# Patient Record
Sex: FEMALE | Race: BLACK OR AFRICAN AMERICAN | Employment: PART TIME | URBAN - METROPOLITAN AREA
[De-identification: names, ages, dates, MRNs, and addresses within clinical notes are randomized per-mention and may not be internally consistent; named-entity substitution may affect disease eponyms.]

---

## 2020-09-13 ENCOUNTER — APPOINTMENT (OUTPATIENT)
Dept: GENERAL RADIOLOGY | Age: 30
End: 2020-09-13
Payer: COMMERCIAL

## 2020-09-13 ENCOUNTER — HOSPITAL ENCOUNTER (EMERGENCY)
Age: 30
Discharge: HOME | End: 2020-09-13
Attending: EMERGENCY MEDICINE
Payer: COMMERCIAL

## 2020-09-13 VITALS
WEIGHT: 172 LBS | HEART RATE: 100 BPM | BODY MASS INDEX: 34.68 KG/M2 | TEMPERATURE: 98.1 F | OXYGEN SATURATION: 98 % | SYSTOLIC BLOOD PRESSURE: 110 MMHG | DIASTOLIC BLOOD PRESSURE: 82 MMHG | RESPIRATION RATE: 18 BRPM | HEIGHT: 59 IN

## 2020-09-13 LAB
HCG, URINE, POC: NEGATIVE
Lab: NORMAL
NEGATIVE QC PASS/FAIL: NORMAL
POSITIVE QC PASS/FAIL: NORMAL

## 2020-09-13 PROCEDURE — 73030 X-RAY EXAM OF SHOULDER: CPT

## 2020-09-13 PROCEDURE — 99283 EMERGENCY DEPT VISIT LOW MDM: CPT

## 2020-09-13 PROCEDURE — 71045 X-RAY EXAM CHEST 1 VIEW: CPT

## 2020-09-13 PROCEDURE — 96372 THER/PROPH/DIAG INJ SC/IM: CPT

## 2020-09-13 PROCEDURE — 6360000002 HC RX W HCPCS: Performed by: EMERGENCY MEDICINE

## 2020-09-13 RX ORDER — IBUPROFEN 800 MG/1
800 TABLET ORAL EVERY 8 HOURS PRN
Qty: 15 TABLET | Refills: 0 | Status: SHIPPED | OUTPATIENT
Start: 2020-09-13 | End: 2021-04-29

## 2020-09-13 RX ORDER — CYCLOBENZAPRINE HCL 5 MG
5 TABLET ORAL 2 TIMES DAILY PRN
Qty: 10 TABLET | Refills: 0 | Status: SHIPPED | OUTPATIENT
Start: 2020-09-13 | End: 2020-09-23

## 2020-09-13 RX ORDER — KETOROLAC TROMETHAMINE 30 MG/ML
30 INJECTION, SOLUTION INTRAMUSCULAR; INTRAVENOUS ONCE
Status: COMPLETED | OUTPATIENT
Start: 2020-09-13 | End: 2020-09-13

## 2020-09-13 RX ORDER — ORPHENADRINE CITRATE 30 MG/ML
60 INJECTION INTRAMUSCULAR; INTRAVENOUS ONCE
Status: COMPLETED | OUTPATIENT
Start: 2020-09-13 | End: 2020-09-13

## 2020-09-13 RX ORDER — SODIUM CHLORIDE 9 MG/ML
INJECTION, SOLUTION INTRAVENOUS CONTINUOUS
Status: DISCONTINUED | OUTPATIENT
Start: 2020-09-13 | End: 2020-09-13

## 2020-09-13 RX ADMIN — KETOROLAC TROMETHAMINE 30 MG: 30 INJECTION, SOLUTION INTRAMUSCULAR; INTRAVENOUS at 05:40

## 2020-09-13 RX ADMIN — ORPHENADRINE CITRATE 60 MG: 60 INJECTION INTRAMUSCULAR; INTRAVENOUS at 05:40

## 2020-09-13 ASSESSMENT — PAIN DESCRIPTION - DESCRIPTORS: DESCRIPTORS: BURNING

## 2020-09-13 ASSESSMENT — PAIN SCALES - GENERAL
PAINLEVEL_OUTOF10: 8
PAINLEVEL_OUTOF10: 10

## 2020-09-13 ASSESSMENT — PAIN DESCRIPTION - PAIN TYPE: TYPE: ACUTE PAIN

## 2020-09-13 ASSESSMENT — PAIN DESCRIPTION - LOCATION: LOCATION: SHOULDER

## 2020-09-13 ASSESSMENT — PAIN DESCRIPTION - FREQUENCY: FREQUENCY: CONTINUOUS

## 2020-09-13 ASSESSMENT — PAIN DESCRIPTION - ORIENTATION: ORIENTATION: LEFT

## 2020-09-13 NOTE — ED PROVIDER NOTES
HPI:  9/13/20,   Time: 5:04 AM EDT         Willa Medina is a 26 y.o. female presenting to the ED for left posterior shoulder pain, beginning 1 week ago. The complaint has been persistent, moderate in severity, and worsened by movement of left shoulder and raising arm. Patient reports no direct trauma or injury. Patient reporting some pain with deep breathing but reports no chest pain she reports no abdominal pain she reports no cough she reports no leg pain or swelling she reports no headache. Patient reports no wrist or elbow pain. Patient reporting no fever or chills or cough. She reports no numbness or tingling in her upper arms or legs. There is no family history of aortic dissection or aneurysm. ROS:   Pertinent positives and negatives are stated within HPI, all other systems reviewed and are negative.  --------------------------------------------- PAST HISTORY ---------------------------------------------  Past Medical History:  has no past medical history on file. Past Surgical History:  has a past surgical history that includes Upper gastrointestinal endoscopy. Social History:  reports that she has been smoking cigarettes. She has a 3.75 pack-year smoking history. She has never used smokeless tobacco. She reports that she does not drink alcohol or use drugs. Family History: family history is not on file. The patients home medications have been reviewed.     Allergies: No known allergies    -------------------------------------------------- RESULTS -------------------------------------------------  All laboratory and radiology results have been personally reviewed by myself   LABS:  Results for orders placed or performed during the hospital encounter of 09/13/20   POC Pregnancy Urine Qual   Result Value Ref Range    HCG, Urine, POC Negative Negative    Lot Number [id number]     Positive QC Pass/Fail Pass     Negative QC Pass/Fail Pass        RADIOLOGY:  Interpreted by Radiologist.  XR CHEST PORTABLE    (Results Pending)   XR SHOULDER LEFT (MIN 2 VIEWS)    (Results Pending)       ------------------------- NURSING NOTES AND VITALS REVIEWED ---------------------------   The nursing notes within the ED encounter and vital signs as below have been reviewed. /82   Pulse 100   Temp 98.1 °F (36.7 °C)   Resp 18   Ht 4' 11\" (1.499 m)   Wt 172 lb (78 kg)   SpO2 98%   BMI 34.74 kg/m²   Oxygen Saturation Interpretation: Normal      ---------------------------------------------------PHYSICAL EXAM--------------------------------------      Constitutional/General: Alert and oriented x3, well appearing, non toxic in NAD  Head: NC/AT  Eyes: PERRL, EOMI  Mouth: Oropharynx clear, handling secretions, no trismus  Neck: Supple, full ROM, no meningeal signs  Pulmonary: Lungs clear to auscultation bilaterally, no wheezes, rales, or rhonchi. Not in respiratory distress  Cardiovascular:  Regular rate and rhythm, no murmurs, gallops, or rubs. 2+ distal pulses  Abdomen: Soft, non tender, non distended,   Extremities: Moves all extremities x 4.   Tender posterior medial shoulder near scapula warm and well perfused  Skin: warm and dry without rash  Neurologic: GCS 15, normal strength all extremities somewhat limited on left side secondary to pain from posterior shoulder pulses are intact distally normal radial pulse  Psych: Normal Affect  This X-Ray is independently viewed and interpreted by me:   - Study: Chest X-Ray   - Number of Views: 1  - Findings: Mediastinum is normal, No infiltrate, No effusion, No cardiomegaly and No pneumothorax    This X-Ray is independently viewed and interpreted by me:   - Study: left shoulder   - Number of Views: 4   - Findings: No fracture or dislocation         ------------------------------ ED COURSE/MEDICAL DECISION MAKING----------------------  Medications   ketorolac (TORADOL) injection 30 mg (30 mg Intramuscular Given 9/13/20 0540)   orphenadrine (NORFLEX) injection 60 mg (60 mg Intramuscular Given 9/13/20 0540)         Medical Decision Making:    Patient reevaluated and medicated. Patient made aware of findings. Patient feeling much better. Patient comfortable being discharged home she is to return if symptoms worsen or persist.    Counseling: The emergency provider has spoken with the patient and discussed todays results, in addition to providing specific details for the plan of care and counseling regarding the diagnosis and prognosis. Questions are answered at this time and they are agreeable with the plan.      --------------------------------- IMPRESSION AND DISPOSITION ---------------------------------    IMPRESSION  1.  Strain of left shoulder, initial encounter        DISPOSITION  Disposition: Discharge to home  Patient condition is stable                  Marleni Becerra MD  09/13/20 4213       Marleni Becerra MD  09/13/20 0256

## 2021-02-19 ENCOUNTER — HOSPITAL ENCOUNTER (EMERGENCY)
Age: 31
Discharge: HOME | End: 2021-02-19
Payer: COMMERCIAL

## 2021-02-19 VITALS
SYSTOLIC BLOOD PRESSURE: 130 MMHG | HEART RATE: 99 BPM | HEIGHT: 59 IN | BODY MASS INDEX: 27.21 KG/M2 | RESPIRATION RATE: 16 BRPM | WEIGHT: 135 LBS | DIASTOLIC BLOOD PRESSURE: 81 MMHG | OXYGEN SATURATION: 100 % | TEMPERATURE: 97.8 F

## 2021-02-19 DIAGNOSIS — Z3A.01 LESS THAN 8 WEEKS GESTATION OF PREGNANCY: Primary | ICD-10-CM

## 2021-02-19 LAB
HCG, URINE, POC: POSITIVE
Lab: NORMAL
NEGATIVE QC PASS/FAIL: NORMAL
POSITIVE QC PASS/FAIL: NORMAL

## 2021-02-19 PROCEDURE — 99282 EMERGENCY DEPT VISIT SF MDM: CPT

## 2021-02-19 RX ORDER — PRENATAL NO.42/FOLIC ACID 1.4 MG
1 TABLET CHEW,IMMED AND DELAY REL,BIPHASE ORAL DAILY
Qty: 30 TABLET | Refills: 0 | Status: SHIPPED | OUTPATIENT
Start: 2021-02-19 | End: 2021-03-21

## 2021-02-19 NOTE — ED PROVIDER NOTES
encounter of 02/19/21   POC Pregnancy Urine   Result Value Ref Range    HCG, Urine, POC Positive Negative    Lot Number [id number]     Positive QC Pass/Fail Pass     Negative QC Pass/Fail Pass        RADIOLOGY:  Interpreted by Radiologist.  No orders to display       ------------------------- NURSING NOTES AND VITALS REVIEWED ---------------------------   The nursing notes within the ED encounter and vital signs as below have been reviewed. /81   Pulse 99   Temp 97.8 °F (36.6 °C)   Resp 16   Ht 4' 11\" (1.499 m)   Wt 135 lb (61.2 kg)   SpO2 100%   BMI 27.27 kg/m²   Oxygen Saturation Interpretation: Normal      ---------------------------------------------------PHYSICAL EXAM--------------------------------------      Constitutional/General: Alert and oriented x3, well appearing, non toxic in NAD  Head: Normocephalic and atraumatic  Eyes: PERRL, EOMI  Mouth: Oropharynx clear, handling secretions, no trismus  Neck: Supple, full ROM,   Pulmonary: Lungs clear to auscultation bilaterally, no wheezes, rales, or rhonchi. Not in respiratory distress  Cardiovascular:  Regular rate and rhythm, no murmurs, gallops, or rubs. 2+ distal pulses  Abdomen: Soft, non tender, non distended, no noted point tenderness. Extremities: Moves all extremities x 4. Warm and well perfused  Skin: warm and dry without rash  Neurologic: GCS 15,  Psych: Normal Affect      ------------------------------ ED COURSE/MEDICAL DECISION MAKING----------------------  Medications - No data to display      ED COURSE:       Medical Decision Making:    Urine pregnancy test positive here. Patient is denying any abdominal pain as well as no noted vaginal bleeding or discharge. States that she is only here for a note to state that she is pregnant because she did not get 1 from the urgent care that she just left from. Patient was provided with prescription for prenatal vitamins.   She was also provided with referral for a new OB/GYN per her request. Patient will be discharged home and educated on the importance of follow-up as well as when to return back to the emergency department with full understanding. Patient nontoxic. Patient neurovascular intact, patient safely discharged home    Counseling: The emergency provider has spoken with the patient and discussed todays results, in addition to providing specific details for the plan of care and counseling regarding the diagnosis and prognosis. Questions are answered at this time and they are agreeable with the plan.      --------------------------------- IMPRESSION AND DISPOSITION ---------------------------------    IMPRESSION  1. Less than 8 weeks gestation of pregnancy        DISPOSITION  Disposition: Discharge to home  Patient condition is good      NOTE: This report was transcribed using voice recognition software.  Every effort was made to ensure accuracy; however, inadvertent computerized transcription errors may be present     LALIT Vora - DANY  02/20/21 0010

## 2021-02-24 ENCOUNTER — HOSPITAL ENCOUNTER (EMERGENCY)
Age: 31
Discharge: HOME | End: 2021-02-24
Payer: COMMERCIAL

## 2021-02-24 ENCOUNTER — APPOINTMENT (OUTPATIENT)
Dept: ULTRASOUND IMAGING | Age: 31
End: 2021-02-24
Payer: COMMERCIAL

## 2021-02-24 VITALS
TEMPERATURE: 97.3 F | RESPIRATION RATE: 16 BRPM | WEIGHT: 134 LBS | HEART RATE: 91 BPM | DIASTOLIC BLOOD PRESSURE: 64 MMHG | HEIGHT: 59 IN | BODY MASS INDEX: 27.01 KG/M2 | OXYGEN SATURATION: 99 % | SYSTOLIC BLOOD PRESSURE: 122 MMHG

## 2021-02-24 DIAGNOSIS — O26.891 ABDOMINAL PAIN DURING PREGNANCY IN FIRST TRIMESTER: Primary | ICD-10-CM

## 2021-02-24 DIAGNOSIS — R10.9 ABDOMINAL PAIN DURING PREGNANCY IN FIRST TRIMESTER: Primary | ICD-10-CM

## 2021-02-24 LAB
ABO/RH: NORMAL
ALBUMIN SERPL-MCNC: 4 G/DL (ref 3.5–5.2)
ALP BLD-CCNC: 47 U/L (ref 35–104)
ALT SERPL-CCNC: 24 U/L (ref 0–32)
ANION GAP SERPL CALCULATED.3IONS-SCNC: 11 MMOL/L (ref 7–16)
AST SERPL-CCNC: 17 U/L (ref 0–31)
BACTERIA: NORMAL /HPF
BASOPHILS ABSOLUTE: 0.04 E9/L (ref 0–0.2)
BASOPHILS RELATIVE PERCENT: 0.6 % (ref 0–2)
BILIRUB SERPL-MCNC: 0.4 MG/DL (ref 0–1.2)
BILIRUBIN URINE: NEGATIVE
BLOOD, URINE: NEGATIVE
BUN BLDV-MCNC: 9 MG/DL (ref 6–20)
CALCIUM SERPL-MCNC: 8.9 MG/DL (ref 8.6–10.2)
CHLORIDE BLD-SCNC: 103 MMOL/L (ref 98–107)
CLARITY: CLEAR
CO2: 23 MMOL/L (ref 22–29)
COLOR: YELLOW
CREAT SERPL-MCNC: 0.9 MG/DL (ref 0.5–1)
EOSINOPHILS ABSOLUTE: 0.08 E9/L (ref 0.05–0.5)
EOSINOPHILS RELATIVE PERCENT: 1.2 % (ref 0–6)
EPITHELIAL CELLS, UA: NORMAL /HPF
GFR AFRICAN AMERICAN: >60
GFR NON-AFRICAN AMERICAN: >60 ML/MIN/1.73
GLUCOSE BLD-MCNC: 84 MG/DL (ref 74–99)
GLUCOSE URINE: NEGATIVE MG/DL
GONADOTROPIN, CHORIONIC (HCG) QUANT: ABNORMAL MIU/ML
HCG, URINE, POC: POSITIVE
HCT VFR BLD CALC: 35.3 % (ref 34–48)
HEMOGLOBIN: 11.4 G/DL (ref 11.5–15.5)
IMMATURE GRANULOCYTES #: 0.02 E9/L
IMMATURE GRANULOCYTES %: 0.3 % (ref 0–5)
KETONES, URINE: NEGATIVE MG/DL
LEUKOCYTE ESTERASE, URINE: ABNORMAL
LYMPHOCYTES ABSOLUTE: 2 E9/L (ref 1.5–4)
LYMPHOCYTES RELATIVE PERCENT: 28.9 % (ref 20–42)
Lab: NORMAL
MCH RBC QN AUTO: 27.7 PG (ref 26–35)
MCHC RBC AUTO-ENTMCNC: 32.3 % (ref 32–34.5)
MCV RBC AUTO: 85.7 FL (ref 80–99.9)
MONOCYTES ABSOLUTE: 0.51 E9/L (ref 0.1–0.95)
MONOCYTES RELATIVE PERCENT: 7.4 % (ref 2–12)
NEGATIVE QC PASS/FAIL: NORMAL
NEUTROPHILS ABSOLUTE: 4.28 E9/L (ref 1.8–7.3)
NEUTROPHILS RELATIVE PERCENT: 61.6 % (ref 43–80)
NITRITE, URINE: NEGATIVE
PDW BLD-RTO: 14.7 FL (ref 11.5–15)
PH UA: 6.5 (ref 5–9)
PLATELET # BLD: 297 E9/L (ref 130–450)
PMV BLD AUTO: 10.6 FL (ref 7–12)
POSITIVE QC PASS/FAIL: NORMAL
POTASSIUM SERPL-SCNC: 3.2 MMOL/L (ref 3.5–5)
PROTEIN UA: NEGATIVE MG/DL
RBC # BLD: 4.12 E12/L (ref 3.5–5.5)
RBC UA: NORMAL /HPF (ref 0–2)
SODIUM BLD-SCNC: 137 MMOL/L (ref 132–146)
SPECIFIC GRAVITY UA: 1.02 (ref 1–1.03)
TOTAL PROTEIN: 6.5 G/DL (ref 6.4–8.3)
UROBILINOGEN, URINE: 0.2 E.U./DL
WBC # BLD: 6.9 E9/L (ref 4.5–11.5)
WBC UA: NORMAL /HPF (ref 0–5)

## 2021-02-24 PROCEDURE — 86900 BLOOD TYPING SEROLOGIC ABO: CPT

## 2021-02-24 PROCEDURE — 99283 EMERGENCY DEPT VISIT LOW MDM: CPT

## 2021-02-24 PROCEDURE — 84702 CHORIONIC GONADOTROPIN TEST: CPT

## 2021-02-24 PROCEDURE — 81001 URINALYSIS AUTO W/SCOPE: CPT

## 2021-02-24 PROCEDURE — 6370000000 HC RX 637 (ALT 250 FOR IP): Performed by: NURSE PRACTITIONER

## 2021-02-24 PROCEDURE — 76817 TRANSVAGINAL US OBSTETRIC: CPT

## 2021-02-24 PROCEDURE — 80053 COMPREHEN METABOLIC PANEL: CPT

## 2021-02-24 PROCEDURE — 85025 COMPLETE CBC W/AUTO DIFF WBC: CPT

## 2021-02-24 PROCEDURE — 86901 BLOOD TYPING SEROLOGIC RH(D): CPT

## 2021-02-24 PROCEDURE — 2580000003 HC RX 258: Performed by: NURSE PRACTITIONER

## 2021-02-24 RX ORDER — 0.9 % SODIUM CHLORIDE 0.9 %
1000 INTRAVENOUS SOLUTION INTRAVENOUS ONCE
Status: COMPLETED | OUTPATIENT
Start: 2021-02-24 | End: 2021-02-24

## 2021-02-24 RX ORDER — ACETAMINOPHEN 500 MG
1000 TABLET ORAL ONCE
Status: COMPLETED | OUTPATIENT
Start: 2021-02-24 | End: 2021-02-24

## 2021-02-24 RX ADMIN — ACETAMINOPHEN 1000 MG: 500 TABLET ORAL at 01:19

## 2021-02-24 RX ADMIN — SODIUM CHLORIDE 1000 ML: 9 INJECTION, SOLUTION INTRAVENOUS at 01:18

## 2021-02-24 ASSESSMENT — PAIN DESCRIPTION - FREQUENCY: FREQUENCY: CONTINUOUS

## 2021-02-24 ASSESSMENT — PAIN SCALES - GENERAL: PAINLEVEL_OUTOF10: 7

## 2021-02-24 ASSESSMENT — PAIN DESCRIPTION - PAIN TYPE: TYPE: ACUTE PAIN

## 2021-02-24 NOTE — ED NOTES
Was contacted by ER and informed that I needed to call out the [facility] for a stat exam to r/o ectopic. Michelle Mendosa was called at [place] [phone].

## 2021-02-24 NOTE — ED PROVIDER NOTES
Independent   HPI:  21, Time: 12:44 AM MICHAEL Bishop is a 26 y.o. female presenting to the ED for right lower abdominal groin pain. Patient presents to the emergency department with ongoing abdominal pain. Patient reports that she did follow-up with the pregnancy center and was seen and evaluated by them she did not have any ultrasound yet to confirm intrauterine pregnancy. Patient reports that the pain is and constant. She does express some mild nausea with this. Patient reports that she does have an appointment next month at that time they will complete an ultrasound. Patient is a  5 para 4. States she is approximately 4 weeks OB. Patient denies any unusual vaginal bleeding or discharge she also denies any odor. Patient states that she does not have a specific OB/GYN and is currently following up with the pregnancy [facility]. Patient reports that she has not yet had an ultrasound to confirm intrauterine pregnancy. States her abdominal pain has been worsening since Friday. Review of Systems:   A complete review of systems was performed and pertinent positives and negatives are stated within HPI, all other systems reviewed and are negative.          --------------------------------------------- PAST HISTORY ---------------------------------------------  Past Medical History:  has no past medical history on file. Past Surgical History:  has a past surgical history that includes Upper gastrointestinal endoscopy. Social History:  reports that she has been smoking cigarettes. She has a 3.75 pack-year smoking history. She has never used smokeless tobacco. She reports that she does not drink alcohol or use drugs. Family History: family history is not on file. The patients home medications have been reviewed.     Allergies: No known allergies    -------------------------------------------------- RESULTS -------------------------------------------------  All laboratory and radiology results have been personally reviewed by myself   LABS:  Results for orders placed or performed during the hospital encounter of 02/24/21   CBC Auto Differential   Result Value Ref Range    WBC 6.9 4.5 - 11.5 E9/L    RBC 4.12 3.50 - 5.50 E12/L    Hemoglobin 11.4 (L) 11.5 - 15.5 g/dL    Hematocrit 35.3 34.0 - 48.0 %    MCV 85.7 80.0 - 99.9 fL    MCH 27.7 26.0 - 35.0 pg    MCHC 32.3 32.0 - 34.5 %    RDW 14.7 11.5 - 15.0 fL    Platelets 120 907 - 418 E9/L    MPV 10.6 7.0 - 12.0 fL    Neutrophils % 61.6 43.0 - 80.0 %    Immature Granulocytes % 0.3 0.0 - 5.0 %    Lymphocytes % 28.9 20.0 - 42.0 %    Monocytes % 7.4 2.0 - 12.0 %    Eosinophils % 1.2 0.0 - 6.0 %    Basophils % 0.6 0.0 - 2.0 %    Neutrophils Absolute 4.28 1.80 - 7.30 E9/L    Immature Granulocytes # 0.02 E9/L    Lymphocytes Absolute 2.00 1.50 - 4.00 E9/L    Monocytes Absolute 0.51 0.10 - 0.95 E9/L    Eosinophils Absolute 0.08 0.05 - 0.50 E9/L    Basophils Absolute 0.04 0.00 - 0.20 E9/L   Comprehensive Metabolic Panel   Result Value Ref Range    Sodium 137 132 - 146 mmol/L    Potassium 3.2 (L) 3.5 - 5.0 mmol/L    Chloride 103 98 - 107 mmol/L    CO2 23 22 - 29 mmol/L    Anion Gap 11 7 - 16 mmol/L    Glucose 84 74 - 99 mg/dL    BUN 9 6 - 20 mg/dL    CREATININE 0.9 0.5 - 1.0 mg/dL    GFR Non-African American >60 >=60 mL/min/1.73    GFR African American >60     Calcium 8.9 8.6 - 10.2 mg/dL    Total Protein 6.5 6.4 - 8.3 g/dL    Albumin 4.0 3.5 - 5.2 g/dL    Total Bilirubin 0.4 0.0 - 1.2 mg/dL    Alkaline Phosphatase 47 35 - 104 U/L    ALT 24 0 - 32 U/L    AST 17 0 - 31 U/L   Urinalysis   Result Value Ref Range    Color, UA Yellow Straw/Yellow    Clarity, UA Clear Clear    Glucose, Ur Negative Negative mg/dL    Bilirubin Urine Negative Negative    Ketones, Urine Negative Negative mg/dL    Specific Gravity, UA 1.025 1.005 - 1.030    Blood, Urine Negative Negative    pH, UA 6.5 5.0 - 9.0    Protein, UA Negative Negative mg/dL    Urobilinogen, Urine 0.2 <2.0 E.U./dL    Nitrite, Urine Negative Negative    Leukocyte Esterase, Urine TRACE (A) Negative   hCG, quantitative, pregnancy   Result Value Ref Range    hCG Quant 38148.0 (H) <10 mIU/mL   Microscopic Urinalysis   Result Value Ref Range    WBC, UA SEE BELOW 0 - 5 /HPF    RBC, UA SEE BELOW 0 - 2 /HPF    Epithelial Cells, UA SEE BELOW /HPF    Bacteria, UA SEE BELOW None Seen /HPF   POC Pregnancy Urine   Result Value Ref Range    HCG, Urine, POC Positive Negative    Lot Number [id number]     Positive QC Pass/Fail Pass     Negative QC Pass/Fail Pass    ABO/RH   Result Value Ref Range    ABO/Rh B POS        RADIOLOGY:  Interpreted by Radiologist.  US OB TRANSVAGINAL   Final Result   Single viable intrauterine pregnancy.          ------------------------- NURSING NOTES AND VITALS REVIEWED ---------------------------   The nursing notes within the ED encounter and vital signs as below have been reviewed. /64   Pulse 91   Temp 97.3 °F (36.3 °C)   Resp 16   Ht 4' 11\" (1.499 m)   Wt 134 lb (60.8 kg)   LMP 01/19/2021   SpO2 99%   BMI 27.06 kg/m²   Oxygen Saturation Interpretation: Normal      ---------------------------------------------------PHYSICAL EXAM--------------------------------------      Constitutional/General: Alert and oriented x3, well appearing, non toxic in NAD  Head: Normocephalic and atraumatic  Eyes: PERRL, EOMI  Mouth: Oropharynx clear, handling secretions, no trismus  Neck: Supple, full ROM,   Pulmonary: Lungs clear to auscultation bilaterally, no wheezes, rales, or rhonchi. Not in respiratory distress  Cardiovascular:  Regular rate and rhythm, no murmurs, gallops, or rubs. 2+ distal pulses  Abdomen: Soft, tender, non distended,   Extremities: Moves all extremities x 4.  Warm and well perfused  Skin: warm and dry without rash  Neurologic: GCS 15,  Psych: Normal Affect      ------------------------------ ED COURSE/MEDICAL DECISION MAKING----------------------  Medications   0.9 % sodium chloride bolus (0 mLs Intravenous Stopped 2/24/21 0227)   acetaminophen (TYLENOL) tablet 1,000 mg (1,000 mg Oral Given 2/24/21 0119)         ED COURSE:       Medical Decision Making: Plan be for labs will also obtain an ultrasound rule out ectopic pregnancy. Patient presenting with abdominal pain primarily to the right lower groin abdomen that started on Friday. Patient denies any unusual vaginal bleeding or discharge or odor. Patient has not yet had an ultrasound to confirm intrauterine pregnancy. Labs resulted CBC unremarkable, chemistry panel with potassium level slightly low at 3.2 will replete. Urinalysis negative, patient is B+. Beta quant 47,054 awaiting ultrasound results. Ultrasound resulted it is fully unremarkable, showing a single intrauterine gestation at 6 weeks and 5 days, fetal heart rate 133. Patient was made aware of results she was educated on pelvic rest, patient likely with muscle skeletal pain. She does have strong right dorsal pedal pulse. There is no unusual hernia palpated to the right inguinal region. And on exam no gross tenderness. Patient otherwise nontoxic, neurovascular intact. She was made aware to return back if she develops any unusual bleeding, return of abdominal pain she was made aware take Tylenol. Patient also educated to follow-up with her OB GYN within the next 3 days for continued close follow-up care. Patient expressed understanding and safely discharged home       Counseling: The emergency provider has spoken with the patient and discussed todays results, in addition to providing specific details for the plan of care and counseling regarding the diagnosis and prognosis. Questions are answered at this time and they are agreeable with the plan.      --------------------------------- IMPRESSION AND DISPOSITION ---------------------------------    IMPRESSION  1.  Abdominal pain during pregnancy in first trimester        DISPOSITION  Disposition: Discharge to home  Patient condition is good      NOTE: This report was transcribed using voice recognition software.  Every effort was made to ensure accuracy; however, inadvertent computerized transcription errors may be present     LALIT Muhammad - DANY  02/24/21 0314

## 2021-06-23 ENCOUNTER — HOSPITAL ENCOUNTER (EMERGENCY)
Age: 31
Discharge: LEFT BEFORE DISCHARGE | End: 2021-06-23
Attending: EMERGENCY MEDICINE
Payer: COMMERCIAL

## 2021-06-23 VITALS
OXYGEN SATURATION: 97 % | TEMPERATURE: 98.7 F | RESPIRATION RATE: 16 BRPM | DIASTOLIC BLOOD PRESSURE: 67 MMHG | SYSTOLIC BLOOD PRESSURE: 106 MMHG | HEART RATE: 115 BPM
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
## 2021-06-23 PROCEDURE — 99285 EMERGENCY DEPT VISIT HI MDM: CPT
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
## 2021-06-23 NOTE — ED NOTES
Bed:  HALL-02  Expected date:   Expected time:   Means of arrival:   Comments:  EMS     Emily Lizama RN  06/23/21 2282

## 2021-07-22 ENCOUNTER — TELEPHONE (OUTPATIENT)
Dept: ADMINISTRATIVE | Age: 31
End: 2021-07-22

## 2021-08-24 ENCOUNTER — INITIAL PRENATAL (OUTPATIENT)
Dept: OBGYN | Age: 31
End: 2021-08-24
Payer: COMMERCIAL

## 2021-08-24 VITALS
SYSTOLIC BLOOD PRESSURE: 123 MMHG | DIASTOLIC BLOOD PRESSURE: 59 MMHG | BODY MASS INDEX: 31.91 KG/M2 | WEIGHT: 158 LBS | HEART RATE: 99 BPM

## 2021-08-24 DIAGNOSIS — Z3A.32 32 WEEKS GESTATION OF PREGNANCY: Primary | ICD-10-CM

## 2021-08-24 LAB
GLUCOSE URINE, POC: NEGATIVE
PROTEIN UA: NEGATIVE

## 2021-08-24 PROCEDURE — 99203 OFFICE O/P NEW LOW 30 MIN: CPT | Performed by: STUDENT IN AN ORGANIZED HEALTH CARE EDUCATION/TRAINING PROGRAM

## 2021-08-24 PROCEDURE — G8427 DOCREV CUR MEDS BY ELIG CLIN: HCPCS | Performed by: STUDENT IN AN ORGANIZED HEALTH CARE EDUCATION/TRAINING PROGRAM

## 2021-08-24 PROCEDURE — 1036F TOBACCO NON-USER: CPT | Performed by: STUDENT IN AN ORGANIZED HEALTH CARE EDUCATION/TRAINING PROGRAM

## 2021-08-24 PROCEDURE — 81002 URINALYSIS NONAUTO W/O SCOPE: CPT | Performed by: STUDENT IN AN ORGANIZED HEALTH CARE EDUCATION/TRAINING PROGRAM

## 2021-08-24 PROCEDURE — G8419 CALC BMI OUT NRM PARAM NOF/U: HCPCS | Performed by: STUDENT IN AN ORGANIZED HEALTH CARE EDUCATION/TRAINING PROGRAM

## 2021-08-24 RX ORDER — FOLIC ACID, .BETA.-CAROTENE, ASCORBIC ACID, CHOLECALCIFEROL, .ALPHA.-TOCOPHEROL ACETATE, DL-, THIAMINE MONONITRATE, RIBOFLAVIN, NIACINAMIDE, PYRIDOXINE HYDROCHLORIDE, CYANOCOBALAMIN, CALCIUM PANTOTHENATE, CALCIUM CARBONATE, FERROUS FUMARATE, AND ZINC OXIDE 1; 1000; 100; 400; 30; 3; 3; 15; 20; 12; 7; 200; 29; 20 MG/1; [IU]/1; MG/1; [IU]/1; [IU]/1; MG/1; MG/1; MG/1; MG/1; UG/1; MG/1; MG/1; MG/1; MG/1
1 TABLET, CHEWABLE ORAL DAILY
Qty: 90 TABLET | Refills: 1 | Status: SHIPPED | OUTPATIENT
Start: 2021-08-24

## 2021-08-24 NOTE — PROGRESS NOTES
Pt new to office and is transferring care from Dr. Jovana Weiner. Pt is a  at 32w4d gestation. She denies feeling any ctxs, lof or vb. Perceives good fetal movement. Discharge instructions have been discussed with the patient by Dr. Joaquin Kwan and she voiced understanding of plan of care. Patient advised to call our office with any questions or concerns.

## 2021-08-24 NOTE — PROGRESS NOTES
ADARSH at 32.4 weeks  Last time seen was at 27 weeks   Per patient, did not do GTT or get anatomy scan  No medical or surgical history   x 4, largest baby < 7 pounds  Baby is moving \"too much. \"  Denies LOF, vaginal bleeding, HA, blurry vision, SOB, tinnitis  Discussed importance of covid vaccine -interested in getting it  [facility] referral for US  Will do GTT this week  Refill of prenatal vitamins  F/u in 2 weeks

## 2021-08-28 ENCOUNTER — HOSPITAL ENCOUNTER (OUTPATIENT)
Age: 31
Discharge: HOME | End: 2021-08-28
Attending: OBSTETRICS & GYNECOLOGY | Admitting: OBSTETRICS & GYNECOLOGY
Payer: COMMERCIAL

## 2021-08-28 PROBLEM — Z34.90 NORMAL PREGNANCY, UNSPECIFIED TRIMESTER: Status: ACTIVE | Noted: 2021-08-28

## 2021-08-28 LAB
BACTERIA: ABNORMAL /HPF
BILIRUBIN URINE: NEGATIVE
BLOOD, URINE: NEGATIVE
CLARITY: CLEAR
COLOR: YELLOW
GLUCOSE URINE: NEGATIVE MG/DL
HCT VFR BLD CALC: 27.5 % (ref 34–48)
HEMOGLOBIN: 9.1 G/DL (ref 11.5–15.5)
KETONES, URINE: NEGATIVE MG/DL
LEUKOCYTE ESTERASE, URINE: ABNORMAL
MCH RBC QN AUTO: 26.9 PG (ref 26–35)
MCHC RBC AUTO-ENTMCNC: 33.1 % (ref 32–34.5)
MCV RBC AUTO: 81.4 FL (ref 80–99.9)
NITRITE, URINE: NEGATIVE
PDW BLD-RTO: 14.9 FL (ref 11.5–15)
PH UA: 7 (ref 5–9)
PLATELET # BLD: 233 E9/L (ref 130–450)
PMV BLD AUTO: 10.6 FL (ref 7–12)
PROTEIN UA: NEGATIVE MG/DL
RBC # BLD: 3.38 E12/L (ref 3.5–5.5)
RBC UA: ABNORMAL /HPF (ref 0–2)
SPECIFIC GRAVITY UA: 1.02 (ref 1–1.03)
UROBILINOGEN, URINE: 0.2 E.U./DL
WBC # BLD: 6.9 E9/L (ref 4.5–11.5)
WBC UA: ABNORMAL /HPF (ref 0–5)

## 2021-08-28 PROCEDURE — 36415 COLL VENOUS BLD VENIPUNCTURE: CPT

## 2021-08-28 PROCEDURE — 81001 URINALYSIS AUTO W/SCOPE: CPT

## 2021-08-28 PROCEDURE — 99235 HOSP IP/OBS SAME DATE MOD 70: CPT | Performed by: OBSTETRICS & GYNECOLOGY

## 2021-08-28 PROCEDURE — 85027 COMPLETE CBC AUTOMATED: CPT

## 2021-08-28 RX ORDER — SODIUM CHLORIDE, SODIUM LACTATE, POTASSIUM CHLORIDE, CALCIUM CHLORIDE 600; 310; 30; 20 MG/100ML; MG/100ML; MG/100ML; MG/100ML
INJECTION, SOLUTION INTRAVENOUS CONTINUOUS
Status: DISCONTINUED | OUTPATIENT
Start: 2021-08-28 | End: 2021-08-28 | Stop reason: HOSPADM

## 2021-08-28 NOTE — PROGRESS NOTES
Pt presents to unit with complaints of abd pain since 1015 this am.  Pt denies any LOF or VB. Good FM perceived. TOCO and EFM applied. Will notify house officer of pts arrival to the unit.

## 2021-08-28 NOTE — H&P
Subjective:      Chely Juarez is an 33 y.o. female at 35 and 1/7 weeks gestation presenting with   Chief Complaint   Patient presents with    Abdominal Pain   . She is also complaining of contractions every a few minutes lasting few seconds. Other associated symptoms include low back pain. Fetal Movement: normal.  No past medical history on file. Review of Systems  Pertinent items are noted in HPI. Objective:      LMP 12/20/2020   Last menstrual period 12/20/2020, unknown if currently breastfeeding. General:   alert, appears stated age and cooperative   Cervix:   closed.    FHT:   140 BPM     Lab Review    CBC:   Lab Results   Component Value Date    WBC 6.9 08/28/2021    RBC 3.38 08/28/2021    HGB 9.1 08/28/2021    HCT 27.5 08/28/2021    MCV 81.4 08/28/2021    MCH 26.9 08/28/2021    MCHC 33.1 08/28/2021    RDW 14.9 08/28/2021     08/28/2021    MPV 10.6 08/28/2021     CMP:    Lab Results   Component Value Date     02/24/2021    K 3.2 02/24/2021     02/24/2021    CO2 23 02/24/2021    BUN 9 02/24/2021    CREATININE 0.9 02/24/2021    GFRAA >60 02/24/2021    LABGLOM >60 02/24/2021    GLUCOSE 84 02/24/2021    GLUCOSE 86 04/02/2012    PROT 6.5 02/24/2021    LABALBU 4.0 02/24/2021    CALCIUM 8.9 02/24/2021    BILITOT 0.4 02/24/2021    ALKPHOS 47 02/24/2021    AST 17 02/24/2021    ALT 24 02/24/2021     U/A:    Lab Results   Component Value Date    NITRITE NEG 04/29/2021    COLORU Yellow 08/28/2021    PHUR 7.0 08/28/2021    WBCUA 0-1 08/28/2021    WBCUA 2-5 04/02/2012    RBCUA NONE 08/28/2021    RBCUA NONE 04/02/2012    BACTERIA FEW 08/28/2021    CLARITYU Clear 08/28/2021    SPECGRAV 1.020 08/28/2021    LEUKOCYTESUR TRACE 08/28/2021    UROBILINOGEN 0.2 08/28/2021    BILIRUBINUR Negative 08/28/2021    BILIRUBINUR POSITIVE 04/29/2021    BILIRUBINUR NEGATIVE 04/02/2012    BLOODU Negative 08/28/2021    GLUCOSEU Negative 08/28/2021    GLUCOSEU NEGATIVE 04/02/2012    AMORPHOUS FEW 08/10/2015 Assessment:     Round ligament pain    Plan:     CBC and urinalysis were obtained and were within normal limits  IV fluids were given and along with bedrest, patient symptoms resolved spontaneously  Discharge home  Follow-up as scheduled    Mis Ventura MD,MD,8/28/2021 5:13 PM

## 2021-08-30 ENCOUNTER — INITIAL PRENATAL (OUTPATIENT)
Dept: OBGYN CLINIC | Age: 31
End: 2021-08-30
Payer: COMMERCIAL

## 2021-08-30 ENCOUNTER — ANCILLARY PROCEDURE (OUTPATIENT)
Dept: OBGYN CLINIC | Age: 31
End: 2021-08-30
Payer: COMMERCIAL

## 2021-08-30 VITALS
SYSTOLIC BLOOD PRESSURE: 108 MMHG | HEART RATE: 103 BPM | WEIGHT: 162.4 LBS | DIASTOLIC BLOOD PRESSURE: 69 MMHG | HEIGHT: 59 IN | BODY MASS INDEX: 32.74 KG/M2

## 2021-08-30 DIAGNOSIS — Z36.89 ENCOUNTER FOR FETAL ANATOMIC SURVEY: ICD-10-CM

## 2021-08-30 DIAGNOSIS — Z3A.33 33 WEEKS GESTATION OF PREGNANCY: ICD-10-CM

## 2021-08-30 DIAGNOSIS — O99.213 OBESITY AFFECTING PREGNANCY IN THIRD TRIMESTER: Primary | ICD-10-CM

## 2021-08-30 LAB
GLUCOSE URINE, POC: NEGATIVE
PROTEIN UA: NEGATIVE

## 2021-08-30 PROCEDURE — 76811 OB US DETAILED SNGL FETUS: CPT | Performed by: OBSTETRICS & GYNECOLOGY

## 2021-08-30 PROCEDURE — G8419 CALC BMI OUT NRM PARAM NOF/U: HCPCS | Performed by: OBSTETRICS & GYNECOLOGY

## 2021-08-30 PROCEDURE — 99242 OFF/OP CONSLTJ NEW/EST SF 20: CPT | Performed by: OBSTETRICS & GYNECOLOGY

## 2021-08-30 PROCEDURE — 81002 URINALYSIS NONAUTO W/O SCOPE: CPT | Performed by: OBSTETRICS & GYNECOLOGY

## 2021-08-30 PROCEDURE — 99203 OFFICE O/P NEW LOW 30 MIN: CPT | Performed by: OBSTETRICS & GYNECOLOGY

## 2021-08-30 PROCEDURE — G8427 DOCREV CUR MEDS BY ELIG CLIN: HCPCS | Performed by: OBSTETRICS & GYNECOLOGY

## 2021-08-30 PROCEDURE — 76819 FETAL BIOPHYS PROFIL W/O NST: CPT | Performed by: OBSTETRICS & GYNECOLOGY
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
## 2021-08-30 NOTE — PATIENT INSTRUCTIONS
if you are sick, not feeling well or have an infectious process going on please reschedule your appointment by calling [phone]. Also if any family members are not feeling well, please do not bring them to your appointment. We appreciate your cooperation. We are doing this in order to protect our pregnant mothers+ their babies. Call your primary obstetrician with bleeding, leaking of fluid, abdominal tenderness, headache, blurry vision, epigastric pain and increased urinary frequency. Any questions contact Shira at [phone]. If you are experiencing an emergency and need immediate help, call 911 or go to go emergency room or labor and delivery. Please arrive for your scheduled appointment at least 15 minutes early with your actual insurance card+ a photo ID. Also if you need any refills ordered or have questions, it may take up 48 hours to reply. Please allow ample time for your refills. Call me when you use last refill. Thank you for your cooperation. You might be having an NST at your next appt. Please eat a large snack or breakfast before coming to office. Thank youDo kick counts after dinner. Call your primary obstetrician if less than 10 kicks in 2 hours after dinner. Call your primary obstetrician with bleeding, leaking of fluid, abdominal tenderness, headache, blurry vision, epigastric pain and increased urinary frequency. Patient Education        Weeks 32 to 29 of Your Pregnancy: Care Instructions  Overview     During the last few weeks of your pregnancy, you may have more aches and pains. It's important to rest when you can. Your growing baby is putting more pressure on your bladder. So you may need to urinate more often. Hemorrhoids are also common. These are painful, itchy veins in the rectal area. You may want to talk with your doctor about banking your baby's umbilical cord blood. This is the blood left in the cord after birth.  If you want to save this blood, you must arrange it ahead of time. You can't decide at the last minute. If you haven't already had the Tdap shot during this pregnancy, talk to your doctor about getting it. It will help protect your  against pertussis infection. Follow-up care is a key part of your treatment and safety. Be sure to make and go to all appointments, and call your doctor if you are having problems. It's also a good idea to know your test results and keep a list of the medicines you take. How can you care for yourself at home? Ease hemorrhoids  · Get more liquids, fruits, vegetables, and fiber in your diet. This will help keep your stools soft. · Avoid sitting for too long. Lie on your left side several times a day. · Clean yourself with soft, moist toilet paper. Or you can use witch hazel pads or personal hygiene pads. · If you are uncomfortable, try ice packs. Or you can sit in a warm sitz bath. Do these for 20 minutes at a time, as needed. · Use hydrocortisone cream for pain and itching. Two examples are Anusol and Preparation H Hydrocortisone. · Ask your doctor about taking an over-the-counter stool softener. Consider breastfeeding  · Experts recommend that women breastfeed for 1 year or longer. · Breast milk may help protect your child from some health problems.  babies are less likely than formula-fed babies to:  ? Get ear infections, colds, diarrhea, and pneumonia. ? Be obese or get diabetes later in life. · Women who breastfeed have less bleeding after the birth. Their uteruses also shrink back faster. · Some women who breastfeed lose weight faster. Making milk burns calories. · Breastfeeding can lower your risk of breast cancer, ovarian cancer, and osteoporosis. Decide about circumcision for boys  · As you make this decision, it may help to think about your personal, Zoroastrianism, and family traditions. You get to decide if you will keep your son's penis natural or if he will be circumcised.   · If you decide that you would like to have your baby circumcised, talk with your doctor. You can share your concerns about pain. And you can discuss your preferences for anesthesia. Where can you learn more? Go to https://chperigo.healthTunePatrol. org and sign in to your CardioDx account. Enter Q121 in the [facility] box to learn more about \"Weeks 32 to 34 of Your Pregnancy: Care Instructions. \"     If you do not have an account, please click on the \"Sign Up Now\" link. Current as of: October 8, 2020               Content Version: 12.9  © 9887-8132 [employer]. Care instructions adapted under license by [facility] ([facility]). If you have questions about a medical condition or this instruction, always ask your healthcare professional. [place] [address] any warranty or liability for your use of this information. Patient Education        Learning About When to Call Your Doctor During Pregnancy (After 20 Weeks)  Your Care Instructions  It's common to have concerns about what might be a problem during pregnancy. Although most pregnant women don't have any serious problems, it's important to know when to call your doctor if you have certain symptoms or signs of labor. These are general suggestions. Your doctor may give you some more information about when to call. When to call your doctor (after 20 weeks)  Call 911  anytime you think you may need emergency care. For example, call if:  · You have severe vaginal bleeding. · You have sudden, severe pain in your belly. · You passed out (lost consciousness). · You have a seizure. · You see or feel the umbilical cord. · You think you are about to deliver your baby and can't make it safely to the hospital.  Call your doctor now or seek immediate medical care if:  · You have vaginal bleeding. · You have belly pain. · You have a fever. · You have symptoms of preeclampsia, such as:  ? Sudden swelling of your face, hands, or feet.   ? New vision problems (such as dimness, blurring, or seeing spots). ? A severe headache. · You have a sudden release of fluid from your vagina. (You think your water broke.)  · You think that you may be in labor. This means that you've had at least 6 contractions in an hour. · You notice that your baby has stopped moving or is moving much less than normal.  · You have symptoms of a urinary tract infection. These may include:  ? Pain or burning when you urinate. ? A frequent need to urinate without being able to pass much urine. ? Pain in the flank, which is just below the rib cage and above the waist on either side of the back. ? Blood in your urine. Watch closely for changes in your health, and be sure to contact your doctor if:  · You have vaginal discharge that smells bad. · You have skin changes, such as:  ? A rash. ? Itching. ? Yellow color to your skin. · You have other concerns about your pregnancy. If you have labor signs at 37 weeks or more  If you have signs of labor at 37 weeks or more, your doctor may tell you to call when your labor becomes more active. Symptoms of active labor include:  · Contractions that are regular. · Contractions that are less than 5 minutes apart. · Contractions that are hard to talk through. Follow-up care is a key part of your treatment and safety. Be sure to make and go to all appointments, and call your doctor if you are having problems. It's also a good idea to know your test results and keep a list of the medicines you take. Where can you learn more? Go to https://Oculus360chantelle.Bundle. org and sign in to your Trampoline account. Enter  in the [facility] box to learn more about \"Learning About When to Call Your Doctor During Pregnancy (After 20 Weeks). \"     If you do not have an account, please click on the \"Sign Up Now\" link. Current as of: October 8, 2020               Content Version: 12.9  © 9821-6107 [employer].    Care instructions adapted under license by [address] If you have questions about a medical condition or this instruction, always ask your healthcare professional. [place] [address] any warranty or liability for your use of this information. Patient Education        Counting Your Baby's Kicks: Care Instructions  Your Care Instructions     Counting your baby's kicks is one way your doctor can tell that your baby is healthy. Most women--especially in a first pregnancy--feel their baby move for the first time between 16 and 22 weeks. The movement may feel like flutters rather than kicks. Your baby may move more at certain times of the day. When you are active, you may notice less kicking than when you are resting. At your prenatal visits, your doctor will ask whether the baby is active. In your last trimester, your doctor may ask you to count the number of times you feel your baby move. Follow-up care is a key part of your treatment and safety. Be sure to make and go to all appointments, and call your doctor if you are having problems. It's also a good idea to know your test results and keep a list of the medicines you take. How do you count fetal kicks? · A common method of checking your baby's movement is to count the number of kicks or moves you feel in 1 hour. Ten movements (such as kicks, flutters, or rolls) in 1 hour are normal. Some doctors suggest that you count in the morning until you get to 10 movements. Then you can quit for that day and start again the next day. · Pick your baby's most active time of day to count. This may be any time from morning to evening. · If you do not feel 10 movements in an hour, your baby may be sleeping. Wait for the next hour and count again. When should you call for help?    Call your doctor now or seek immediate medical care if:    · You noticed that your baby has stopped moving or is moving much less than normal.   Watch closely for changes in your health, and be sure to

## 2021-09-15 ENCOUNTER — ROUTINE PRENATAL (OUTPATIENT)
Dept: OBGYN | Age: 31
End: 2021-09-15
Payer: COMMERCIAL

## 2021-09-15 VITALS
WEIGHT: 160 LBS | DIASTOLIC BLOOD PRESSURE: 70 MMHG | BODY MASS INDEX: 32.32 KG/M2 | HEART RATE: 75 BPM | SYSTOLIC BLOOD PRESSURE: 106 MMHG

## 2021-09-15 DIAGNOSIS — E66.09 CLASS 1 OBESITY DUE TO EXCESS CALORIES WITHOUT SERIOUS COMORBIDITY WITH BODY MASS INDEX (BMI) OF 30.0 TO 30.9 IN ADULT: ICD-10-CM

## 2021-09-15 DIAGNOSIS — Z34.93 PRENATAL CARE IN THIRD TRIMESTER: Primary | ICD-10-CM

## 2021-09-15 DIAGNOSIS — B37.9 MONILIA INFECTION: ICD-10-CM

## 2021-09-15 PROCEDURE — G8427 DOCREV CUR MEDS BY ELIG CLIN: HCPCS | Performed by: OBSTETRICS & GYNECOLOGY

## 2021-09-15 PROCEDURE — 99213 OFFICE O/P EST LOW 20 MIN: CPT | Performed by: OBSTETRICS & GYNECOLOGY

## 2021-09-15 PROCEDURE — 99212 OFFICE O/P EST SF 10 MIN: CPT | Performed by: OBSTETRICS & GYNECOLOGY

## 2021-09-15 PROCEDURE — G8419 CALC BMI OUT NRM PARAM NOF/U: HCPCS | Performed by: OBSTETRICS & GYNECOLOGY

## 2021-09-15 PROCEDURE — 4004F PT TOBACCO SCREEN RCVD TLK: CPT | Performed by: OBSTETRICS & GYNECOLOGY

## 2021-09-15 NOTE — PROGRESS NOTES
This is a new patient to me. Transferred a bit ago from Dr. Anisha Ramirez. Has been seeing MFM. Dr. Lito Gao wanted her to get a 2 hour GTT but that has not been done, discussed with the patient. Cultures done today and sent to lab. Patient has an order she says from Dr. Lito Gao for the 2 hour GTT. I told her we need that done ASAP.

## 2021-09-15 NOTE — PROGRESS NOTES
Pt is here today for prenatal visit at 35w5d. Pt having Morning Sun Silva contractions. Pt unable to void at this time. Baby is active per pt. Denies bleeding, leaking of fluids. Specimen obtained, labeled and sent to lab. Plan of care discussed with pt by provider.

## 2021-10-05 ENCOUNTER — ROUTINE PRENATAL (OUTPATIENT)
Dept: OBGYN | Age: 31
End: 2021-10-05
Payer: COMMERCIAL

## 2021-10-05 VITALS
BODY MASS INDEX: 34.74 KG/M2 | WEIGHT: 172 LBS | SYSTOLIC BLOOD PRESSURE: 135 MMHG | HEART RATE: 113 BPM | DIASTOLIC BLOOD PRESSURE: 69 MMHG

## 2021-10-05 DIAGNOSIS — Z34.93 PRENATAL CARE IN THIRD TRIMESTER: Primary | ICD-10-CM

## 2021-10-05 PROCEDURE — 99213 OFFICE O/P EST LOW 20 MIN: CPT | Performed by: STUDENT IN AN ORGANIZED HEALTH CARE EDUCATION/TRAINING PROGRAM

## 2021-10-05 PROCEDURE — G8427 DOCREV CUR MEDS BY ELIG CLIN: HCPCS | Performed by: STUDENT IN AN ORGANIZED HEALTH CARE EDUCATION/TRAINING PROGRAM

## 2021-10-05 PROCEDURE — G8484 FLU IMMUNIZE NO ADMIN: HCPCS | Performed by: STUDENT IN AN ORGANIZED HEALTH CARE EDUCATION/TRAINING PROGRAM

## 2021-10-05 PROCEDURE — G8419 CALC BMI OUT NRM PARAM NOF/U: HCPCS | Performed by: STUDENT IN AN ORGANIZED HEALTH CARE EDUCATION/TRAINING PROGRAM

## 2021-10-05 PROCEDURE — 4004F PT TOBACCO SCREEN RCVD TLK: CPT | Performed by: STUDENT IN AN ORGANIZED HEALTH CARE EDUCATION/TRAINING PROGRAM

## 2021-10-05 NOTE — PROGRESS NOTES
Pt here today for routine prenatal visit at 38w4d. Pt states feeling some contractions and rectal pressure. Chandler Jerry or vb. Perceives good fetal movement. Patient unable to provide urine specimen. Discharge instructions have been discussed with the pt by Dr. Donnie Moreno and pt verbalized understanding of plan of care. Patient advised to call our office with any questions or concerns.

## 2021-10-05 NOTE — PROGRESS NOTES
IUP at 38.4  +Cramping and irregular cxs  SVE: 1-2/50/-3  FHT present  Baby moving well  Denies LOF, vaginal bleeding  Has NST this PM  Stressed importance of COVID vaccine again  GBS+  F/u 1 week

## 2021-10-06 ENCOUNTER — HOSPITAL ENCOUNTER (OUTPATIENT)
Age: 31
Discharge: HOME | DRG: 560 | End: 2021-10-06
Attending: STUDENT IN AN ORGANIZED HEALTH CARE EDUCATION/TRAINING PROGRAM | Admitting: STUDENT IN AN ORGANIZED HEALTH CARE EDUCATION/TRAINING PROGRAM
Payer: COMMERCIAL

## 2021-10-06 ENCOUNTER — ANESTHESIA EVENT (OUTPATIENT)
Dept: LABOR AND DELIVERY | Age: 31
End: 2021-10-06

## 2021-10-06 ENCOUNTER — ANESTHESIA (OUTPATIENT)
Dept: LABOR AND DELIVERY | Age: 31
End: 2021-10-06

## 2021-10-06 VITALS
SYSTOLIC BLOOD PRESSURE: 131 MMHG | OXYGEN SATURATION: 100 % | BODY MASS INDEX: 34.07 KG/M2 | TEMPERATURE: 98.2 F | HEART RATE: 90 BPM | HEIGHT: 59 IN | RESPIRATION RATE: 16 BRPM | DIASTOLIC BLOOD PRESSURE: 64 MMHG | WEIGHT: 169 LBS

## 2021-10-06 PROBLEM — O47.9 UTERINE CONTRACTIONS DURING PREGNANCY: Status: ACTIVE | Noted: 2021-10-06

## 2021-10-06 PROCEDURE — 6360000002 HC RX W HCPCS: Performed by: MIDWIFE

## 2021-10-06 PROCEDURE — 99211 OFF/OP EST MAY X REQ PHY/QHP: CPT

## 2021-10-06 PROCEDURE — 99213 OFFICE O/P EST LOW 20 MIN: CPT | Performed by: MIDWIFE

## 2021-10-06 PROCEDURE — 2580000003 HC RX 258: Performed by: MIDWIFE

## 2021-10-06 RX ORDER — SODIUM CHLORIDE, SODIUM LACTATE, POTASSIUM CHLORIDE, CALCIUM CHLORIDE 600; 310; 30; 20 MG/100ML; MG/100ML; MG/100ML; MG/100ML
INJECTION, SOLUTION INTRAVENOUS CONTINUOUS
Status: DISCONTINUED | OUTPATIENT
Start: 2021-10-06 | End: 2021-10-06 | Stop reason: HOSPADM

## 2021-10-06 RX ADMIN — BUTORPHANOL TARTRATE 2 MG: 2 INJECTION, SOLUTION INTRAMUSCULAR; INTRAVENOUS at 05:23

## 2021-10-06 RX ADMIN — SODIUM CHLORIDE, POTASSIUM CHLORIDE, SODIUM LACTATE AND CALCIUM CHLORIDE: 600; 310; 30; 20 INJECTION, SOLUTION INTRAVENOUS at 05:08

## 2021-10-06 ASSESSMENT — LIFESTYLE VARIABLES: SMOKING_STATUS: 1

## 2021-10-06 ASSESSMENT — PAIN SCALES - GENERAL: PAINLEVEL_OUTOF10: 8

## 2021-10-06 NOTE — H&P
Department of Obstetrics and Gynecology   Obstetrics History and Physical      CHIEF COMPLAINT:  contractions    HISTORY OF PRESENT ILLNESS:      The patient is a 33 y.o. [id number] at 45  Weeks 5 days  Patient presents with a chief complaint as above and is being admitted for contractions that began at 22:00. Pt states ctx increasing in frequency, intensity remains unchanged, does not want anything for pain at this time. No LOF/VB.  + FM. Denies complications with pregnancy. [place], THC use during pregnancy, baby AGA with head measuring small  GBS+     DATES:    Patient's last menstrual period was 2020. Estimated Date of Delivery: 10/15/21    PRENATAL CARE:    Provider:  Pattie Yung    Blood Type/Rh:  B positive  Hepatitis B Surface Antigen: unknown  Gonorrhea:  negative  Chlamydia:  negative  Group B Strep:  Positive      PAST OB HISTORY        Depression:  No      Post-partum depression:  No      Diabetes:  No      Gestational Diabetes:  No      Thyroid Disease:  No      Chronic HTN:  No      Gestation HTN:  No      Pre-eclampsia:  No      Seizure disorder:  No      Asthma:  No      Clotting disorder:  No      :  No      Tubal ligation:  No      D & C:  No      Cerclage:  No      LEEP:  No      Myomectomy:  No    OB History    Para Term  AB Living   5 4 4     4   SAB TAB Ectopic Molar Multiple Live Births             4      # Outcome Date GA Lbr Leo/2nd Weight Sex Delivery Anes PTL Lv   5 Current            4 Term 17 39w1d  6 lb 9 oz (2.977 kg) M Vag-Spont EPI N NOEMI   3 Term 08/11/10 38w0d   M Vag-Spont EPI N NOEMI   2 Term 10/10/09 38w0d  6 lb (2.722 kg) F Vag-Spont EPI  NOEMI   1 Term 07 40w0d  6 lb 15 oz (3.147 kg) M Vag-Spont EPI  NOEMI           Past Medical History:    No past medical history on file.   Past Surgical History:        Procedure Laterality Date    UPPER GASTROINTESTINAL ENDOSCOPY       Social History:    Admits to smoking, edible THC, no alcohol use  Family History:   No family history on file. Medications Prior to Admission:  Medications Prior to Admission: Prenatal Vit-Fe Fumarate-FA (PRENATAL 19) 29-1 MG CHEW, Take 1 tablet by mouth daily  Allergies:  No known allergies        PHYSICAL EXAM:    VITALS:  /64   Pulse 90   Temp 98.2 °F (36.8 °C)   Resp 16   Ht 4' 11\" (1.499 m)   Wt 169 lb (76.7 kg)   LMP 12/20/2020   SpO2 100%   BMI 34.13 kg/m²       General appearance:  awake, alert, cooperative, no apparent distress, and appears stated age  Neurologic:  Awake, alert, oriented to name, place and time.     Lungs:  No increased work of breathing, good air exchange, clear to auscultation bilaterally, no crackles or wheezing  Heart:  Normal apical impulse, regular rate and rhythm  Abdomen:  Gravid, soft, nontender  Fetal heart rate:  Baseline Heart Rate 125, accelerations:  present  long term variability:  moderate  decelerations:  absent  Pelvis:  External Genitalia: General appearance; normal, Hair distribution; normal, Lesions absent  Vagina: Pelvic support normal  Cervix:    DILATION:  FT  EFFACEMENT:   50%  STATION:  -3 cm  CONSISTENCY:  medium  POSITION:  posterior    PRESENTATION: cephalic      Contraction frequency:  4 minutes  Membranes:  Intact      ASSESSMENT AND PLAN:  Active Problems:   IUP @ 38 5/7 week gestation   Contractions  Discussed with Dr. Onetha Dust IVF Macie Carrel, APRN - CNM

## 2021-10-06 NOTE — PROGRESS NOTES
Written and oral discharge paperwork given to pt. Standard precautions reviewed. Pt verbalized understanding. No questions voiced at this time. Pt awaiting ride. Instructed to notify a nurse when she is leaving.

## 2021-10-06 NOTE — PROGRESS NOTES
Stephanie Garcia CNNEO updated SVE unchanged. CTX spaced and pt states that she would like to go home. States that she will notify house physician.

## 2021-10-06 NOTE — PROGRESS NOTES
Presents to L&D with c/o contractions/cramping. Denies LOF, vb, or discharge. Reports fetal movement.

## 2021-10-06 NOTE — ANESTHESIA PRE PROCEDURE
Department of Anesthesiology  Preprocedure Note       Name:  Kenzie Ellison   Age:  33 y.o.  :  1990                                          MRN:  [id number]         Date:  10/6/2021      Surgeon: * No surgeons listed *    Procedure: * No procedures listed *    Medications prior to admission:   Prior to Admission medications    Medication Sig Start Date End Date Taking? Authorizing Provider   Prenatal Vit-Fe Fumarate-FA (PRENATAL 19) 29-1 MG CHEW Take 1 tablet by mouth daily 21  Yes Gene Blevins DO       Current medications:    Current Facility-Administered Medications   Medication Dose Route Frequency Provider Last Rate Last Admin    lactated ringers infusion   IntraVENous Continuous Jani Gilbert, APRN -  mL/hr at 10/06/21 0508 New Bag at 10/06/21 0283       Allergies: Allergies   Allergen Reactions    No Known Allergies        Problem List:    Patient Active Problem List   Diagnosis Code    Normal pregnancy, unspecified trimester Z34.90    Uterine contractions during pregnancy O47.9       Past Medical History:  No past medical history on file. Past Surgical History:        Procedure Laterality Date    UPPER GASTROINTESTINAL ENDOSCOPY         Social History:    Social History     Tobacco Use    Smoking status: Current Some Day Smoker     Packs/day: 0.25     Years: 15.00     Pack years: 3.75     Types: Cigarettes     Start date: 6/15/2021    Smokeless tobacco: Never Used   Substance Use Topics    Alcohol use:  No                                Ready to quit: Not Answered  Counseling given: Not Answered      Vital Signs (Current):   Vitals:    10/06/21 0420 10/06/21 0424 10/06/21 0503   BP: 131/64 131/64    Pulse: 90 90 90   Resp:  16    Temp:  36.8 °C (98.2 °F)    SpO2:  100%    Weight:  169 lb (76.7 kg)    Height:  4' 11\" (1.499 m)                                               BP Readings from Last 3 Encounters:   10/06/21 131/64   10/05/21 135/69   09/15/21 106/70       NPO Status: Time of last liquid consumption: 2300                        Time of last solid consumption: 2300                        Date of last liquid consumption: 10/05/21                        Date of last solid food consumption: 10/05/21    BMI:   Wt Readings from Last 3 Encounters:   10/06/21 169 lb (76.7 kg)   10/05/21 172 lb (78 kg)   09/15/21 160 lb (72.6 kg)     Body mass index is 34.13 kg/m². CBC:   Lab Results   Component Value Date    WBC 6.9 08/28/2021    RBC 3.38 08/28/2021    HGB 9.1 08/28/2021    HCT 27.5 08/28/2021    MCV 81.4 08/28/2021    RDW 14.9 08/28/2021     08/28/2021       CMP:   Lab Results   Component Value Date     02/24/2021    K 3.2 02/24/2021     02/24/2021    CO2 23 02/24/2021    BUN 9 02/24/2021    CREATININE 0.9 02/24/2021    GFRAA >60 02/24/2021    LABGLOM >60 02/24/2021    GLUCOSE 84 02/24/2021    GLUCOSE 86 04/02/2012    PROT 6.5 02/24/2021    CALCIUM 8.9 02/24/2021    BILITOT 0.4 02/24/2021    ALKPHOS 47 02/24/2021    AST 17 02/24/2021    ALT 24 02/24/2021       POC Tests: No results for input(s): POCGLU, POCNA, POCK, POCCL, POCBUN, POCHEMO, POCHCT in the last 72 hours.     Coags: No results found for: PROTIME, INR, APTT    HCG (If Applicable):   Lab Results   Component Value Date    PREGTESTUR POSITIVE 04/29/2021        ABGs: No results found for: PHART, PO2ART, EHM1BSF, EHZ6BGO, BEART, A9EIKQLT     Type & Screen (If Applicable):  No results found for: LABABO, LABRH    Drug/Infectious Status (If Applicable):  No results found for: HIV, HEPCAB    COVID-19 Screening (If Applicable): No results found for: COVID19        Anesthesia Evaluation  Patient summary reviewed and Nursing notes reviewed no history of anesthetic complications:   Airway: Mallampati: II  TM distance: >3 FB   Neck ROM: full  Mouth opening: > = 3 FB Dental:          Pulmonary:Negative Pulmonary ROS breath sounds clear to auscultation  (+) current smoker          Patient did not smoke on day of surgery. Cardiovascular:  Exercise tolerance: good (>4 METS),           Rhythm: regular  Rate: normal                    Neuro/Psych:   Negative Neuro/Psych ROS              GI/Hepatic/Renal: Neg GI/Hepatic/Renal ROS            Endo/Other:    (+) blood dyscrasia: anemia:., .                 Abdominal:             Vascular: negative vascular ROS. Other Findings:             Anesthesia Plan      epidural     ASA 2     (Discussed risks and benefits of Epidural. )        Anesthetic plan and risks discussed with patient. Plan discussed with CRNA and attending.                   Kayce Sim RN   10/6/2021

## 2021-10-08 ENCOUNTER — HOSPITAL ENCOUNTER (INPATIENT)
Age: 31
LOS: 2 days | Discharge: HOME | DRG: 560 | End: 2021-10-10
Attending: STUDENT IN AN ORGANIZED HEALTH CARE EDUCATION/TRAINING PROGRAM | Admitting: STUDENT IN AN ORGANIZED HEALTH CARE EDUCATION/TRAINING PROGRAM
Payer: COMMERCIAL

## 2021-10-08 ENCOUNTER — ANESTHESIA EVENT (OUTPATIENT)
Dept: LABOR AND DELIVERY | Age: 31
DRG: 560 | End: 2021-10-08
Payer: COMMERCIAL

## 2021-10-08 ENCOUNTER — ANESTHESIA (OUTPATIENT)
Dept: LABOR AND DELIVERY | Age: 31
DRG: 560 | End: 2021-10-08
Payer: COMMERCIAL

## 2021-10-08 PROBLEM — Z3A.39 39 WEEKS GESTATION OF PREGNANCY: Status: ACTIVE | Noted: 2021-10-08

## 2021-10-08 LAB
ABO/RH: NORMAL
AMPHETAMINE SCREEN, URINE: POSITIVE
ANTIBODY SCREEN: NORMAL
BARBITURATE SCREEN URINE: NOT DETECTED
BENZODIAZEPINE SCREEN, URINE: NOT DETECTED
CANNABINOID SCREEN URINE: POSITIVE
COCAINE METABOLITE SCREEN URINE: NOT DETECTED
FENTANYL SCREEN, URINE: NOT DETECTED
HCT VFR BLD CALC: 35.9 % (ref 34–48)
HEMOGLOBIN: 11.7 G/DL (ref 11.5–15.5)
Lab: ABNORMAL
MCH RBC QN AUTO: 25.4 PG (ref 26–35)
MCHC RBC AUTO-ENTMCNC: 32.6 % (ref 32–34.5)
MCV RBC AUTO: 78 FL (ref 80–99.9)
METHADONE SCREEN, URINE: NOT DETECTED
OPIATE SCREEN URINE: NOT DETECTED
OXYCODONE URINE: NOT DETECTED
PDW BLD-RTO: 16.8 FL (ref 11.5–15)
PHENCYCLIDINE SCREEN URINE: NOT DETECTED
PLATELET # BLD: 306 E9/L (ref 130–450)
PMV BLD AUTO: 11.1 FL (ref 7–12)
RBC # BLD: 4.6 E12/L (ref 3.5–5.5)
WBC # BLD: 7.7 E9/L (ref 4.5–11.5)

## 2021-10-08 PROCEDURE — 90471 IMMUNIZATION ADMIN: CPT | Performed by: MIDWIFE

## 2021-10-08 PROCEDURE — 6370000000 HC RX 637 (ALT 250 FOR IP): Performed by: STUDENT IN AN ORGANIZED HEALTH CARE EDUCATION/TRAINING PROGRAM

## 2021-10-08 PROCEDURE — 59409 OBSTETRICAL CARE: CPT | Performed by: MIDWIFE

## 2021-10-08 PROCEDURE — 6360000002 HC RX W HCPCS: Performed by: STUDENT IN AN ORGANIZED HEALTH CARE EDUCATION/TRAINING PROGRAM

## 2021-10-08 PROCEDURE — 2500000003 HC RX 250 WO HCPCS: Performed by: ANESTHESIOLOGY

## 2021-10-08 PROCEDURE — 2580000003 HC RX 258: Performed by: STUDENT IN AN ORGANIZED HEALTH CARE EDUCATION/TRAINING PROGRAM

## 2021-10-08 PROCEDURE — 6360000002 HC RX W HCPCS: Performed by: MIDWIFE

## 2021-10-08 PROCEDURE — 86900 BLOOD TYPING SEROLOGIC ABO: CPT

## 2021-10-08 PROCEDURE — 85027 COMPLETE CBC AUTOMATED: CPT

## 2021-10-08 PROCEDURE — G0480 DRUG TEST DEF 1-7 CLASSES: HCPCS

## 2021-10-08 PROCEDURE — 86901 BLOOD TYPING SEROLOGIC RH(D): CPT

## 2021-10-08 PROCEDURE — 36415 COLL VENOUS BLD VENIPUNCTURE: CPT

## 2021-10-08 PROCEDURE — 1220000000 HC SEMI PRIVATE OB R&B

## 2021-10-08 PROCEDURE — 90715 TDAP VACCINE 7 YRS/> IM: CPT | Performed by: MIDWIFE

## 2021-10-08 PROCEDURE — 6370000000 HC RX 637 (ALT 250 FOR IP): Performed by: MIDWIFE

## 2021-10-08 PROCEDURE — 7200000001 HC VAGINAL DELIVERY

## 2021-10-08 PROCEDURE — 6360000002 HC RX W HCPCS

## 2021-10-08 PROCEDURE — 51701 INSERT BLADDER CATHETER: CPT

## 2021-10-08 PROCEDURE — 86850 RBC ANTIBODY SCREEN: CPT

## 2021-10-08 PROCEDURE — 80307 DRUG TEST PRSMV CHEM ANLYZR: CPT

## 2021-10-08 PROCEDURE — 3700000025 EPIDURAL BLOCK: Performed by: ANESTHESIOLOGY

## 2021-10-08 RX ORDER — SODIUM CHLORIDE 0.9 % (FLUSH) 0.9 %
5-40 SYRINGE (ML) INJECTION EVERY 12 HOURS SCHEDULED
Status: DISCONTINUED | OUTPATIENT
Start: 2021-10-08 | End: 2021-10-10 | Stop reason: HOSPADM

## 2021-10-08 RX ORDER — SODIUM CHLORIDE, SODIUM LACTATE, POTASSIUM CHLORIDE, AND CALCIUM CHLORIDE .6; .31; .03; .02 G/100ML; G/100ML; G/100ML; G/100ML
500 INJECTION, SOLUTION INTRAVENOUS PRN
Status: DISCONTINUED | OUTPATIENT
Start: 2021-10-08 | End: 2021-10-10 | Stop reason: HOSPADM

## 2021-10-08 RX ORDER — SODIUM CHLORIDE 9 MG/ML
25 INJECTION, SOLUTION INTRAVENOUS PRN
Status: DISCONTINUED | OUTPATIENT
Start: 2021-10-08 | End: 2021-10-10 | Stop reason: HOSPADM

## 2021-10-08 RX ORDER — PENICILLIN G 3000000 [IU]/50ML
3 INJECTION, SOLUTION INTRAVENOUS EVERY 4 HOURS
Status: DISCONTINUED | OUTPATIENT
Start: 2021-10-08 | End: 2021-10-08

## 2021-10-08 RX ORDER — ACETAMINOPHEN 325 MG/1
650 TABLET ORAL EVERY 4 HOURS PRN
Status: DISCONTINUED | OUTPATIENT
Start: 2021-10-08 | End: 2021-10-10 | Stop reason: HOSPADM

## 2021-10-08 RX ORDER — SODIUM CHLORIDE 0.9 % (FLUSH) 0.9 %
5-40 SYRINGE (ML) INJECTION PRN
Status: DISCONTINUED | OUTPATIENT
Start: 2021-10-08 | End: 2021-10-10 | Stop reason: HOSPADM

## 2021-10-08 RX ORDER — FERROUS SULFATE 325(65) MG
325 TABLET ORAL 2 TIMES DAILY WITH MEALS
Status: DISCONTINUED | OUTPATIENT
Start: 2021-10-08 | End: 2021-10-10 | Stop reason: HOSPADM

## 2021-10-08 RX ORDER — NALOXONE HYDROCHLORIDE 0.4 MG/ML
0.4 INJECTION, SOLUTION INTRAMUSCULAR; INTRAVENOUS; SUBCUTANEOUS PRN
Status: DISCONTINUED | OUTPATIENT
Start: 2021-10-08 | End: 2021-10-08 | Stop reason: HOSPADM

## 2021-10-08 RX ORDER — SODIUM CHLORIDE, SODIUM LACTATE, POTASSIUM CHLORIDE, AND CALCIUM CHLORIDE .6; .31; .03; .02 G/100ML; G/100ML; G/100ML; G/100ML
1000 INJECTION, SOLUTION INTRAVENOUS PRN
Status: DISCONTINUED | OUTPATIENT
Start: 2021-10-08 | End: 2021-10-10 | Stop reason: HOSPADM

## 2021-10-08 RX ORDER — SODIUM CHLORIDE, SODIUM LACTATE, POTASSIUM CHLORIDE, CALCIUM CHLORIDE 600; 310; 30; 20 MG/100ML; MG/100ML; MG/100ML; MG/100ML
INJECTION, SOLUTION INTRAVENOUS CONTINUOUS
Status: DISCONTINUED | OUTPATIENT
Start: 2021-10-08 | End: 2021-10-10 | Stop reason: HOSPADM

## 2021-10-08 RX ORDER — DOCUSATE SODIUM 100 MG/1
100 CAPSULE, LIQUID FILLED ORAL 2 TIMES DAILY
Status: DISCONTINUED | OUTPATIENT
Start: 2021-10-08 | End: 2021-10-10 | Stop reason: HOSPADM

## 2021-10-08 RX ORDER — ONDANSETRON 2 MG/ML
4 INJECTION INTRAMUSCULAR; INTRAVENOUS EVERY 6 HOURS PRN
Status: DISCONTINUED | OUTPATIENT
Start: 2021-10-08 | End: 2021-10-08 | Stop reason: HOSPADM

## 2021-10-08 RX ORDER — LIDOCAINE HYDROCHLORIDE 10 MG/ML
INJECTION, SOLUTION INFILTRATION; PERINEURAL
Status: DISCONTINUED
Start: 2021-10-08 | End: 2021-10-08 | Stop reason: WASHOUT

## 2021-10-08 RX ORDER — NALBUPHINE HCL 10 MG/ML
5 AMPUL (ML) INJECTION EVERY 4 HOURS PRN
Status: DISCONTINUED | OUTPATIENT
Start: 2021-10-08 | End: 2021-10-08 | Stop reason: HOSPADM

## 2021-10-08 RX ORDER — ONDANSETRON 2 MG/ML
4 INJECTION INTRAMUSCULAR; INTRAVENOUS EVERY 6 HOURS PRN
Status: DISCONTINUED | OUTPATIENT
Start: 2021-10-08 | End: 2021-10-10 | Stop reason: HOSPADM

## 2021-10-08 RX ORDER — MODIFIED LANOLIN
OINTMENT (GRAM) TOPICAL PRN
Status: DISCONTINUED | OUTPATIENT
Start: 2021-10-08 | End: 2021-10-10 | Stop reason: HOSPADM

## 2021-10-08 RX ORDER — IBUPROFEN 800 MG/1
800 TABLET ORAL EVERY 6 HOURS PRN
Status: DISCONTINUED | OUTPATIENT
Start: 2021-10-08 | End: 2021-10-10 | Stop reason: HOSPADM

## 2021-10-08 RX ORDER — PENICILLIN G POTASSIUM 5000000 [IU]/1
INJECTION, POWDER, FOR SOLUTION INTRAMUSCULAR; INTRAVENOUS
Status: DISPENSED
Start: 2021-10-08 | End: 2021-10-09

## 2021-10-08 RX ORDER — ACETAMINOPHEN 650 MG
TABLET, EXTENDED RELEASE ORAL
Status: DISCONTINUED
Start: 2021-10-08 | End: 2021-10-08 | Stop reason: WASHOUT

## 2021-10-08 RX ADMIN — BUTORPHANOL TARTRATE 2 MG: 2 INJECTION, SOLUTION INTRAMUSCULAR; INTRAVENOUS at 12:04

## 2021-10-08 RX ADMIN — TETANUS TOXOID, REDUCED DIPHTHERIA TOXOID AND ACELLULAR PERTUSSIS VACCINE, ADSORBED 0.5 ML: 5; 2.5; 8; 8; 2.5 SUSPENSION INTRAMUSCULAR at 23:27

## 2021-10-08 RX ADMIN — IBUPROFEN 800 MG: 800 TABLET, FILM COATED ORAL at 23:27

## 2021-10-08 RX ADMIN — IBUPROFEN 800 MG: 800 TABLET, FILM COATED ORAL at 18:28

## 2021-10-08 RX ADMIN — SODIUM CHLORIDE, POTASSIUM CHLORIDE, SODIUM LACTATE AND CALCIUM CHLORIDE: 600; 310; 30; 20 INJECTION, SOLUTION INTRAVENOUS at 12:50

## 2021-10-08 RX ADMIN — PENICILLIN G POTASSIUM 5 MILLION UNITS: 5000000 INJECTION, POWDER, FOR SOLUTION INTRAMUSCULAR; INTRAVENOUS at 12:09

## 2021-10-08 RX ADMIN — Medication 2 MG: at 12:04

## 2021-10-08 RX ADMIN — DOCUSATE SODIUM 100 MG: 100 CAPSULE ORAL at 20:05

## 2021-10-08 RX ADMIN — Medication 15 ML/HR: at 13:31

## 2021-10-08 RX ADMIN — SODIUM CHLORIDE, POTASSIUM CHLORIDE, SODIUM LACTATE AND CALCIUM CHLORIDE 1000 ML: 600; 310; 30; 20 INJECTION, SOLUTION INTRAVENOUS at 11:50

## 2021-10-08 RX ADMIN — Medication 166.7 ML: at 14:46

## 2021-10-08 RX ADMIN — ACETAMINOPHEN 650 MG: 325 TABLET ORAL at 20:05

## 2021-10-08 RX ADMIN — SODIUM CHLORIDE, PRESERVATIVE FREE 10 ML: 5 INJECTION INTRAVENOUS at 18:28

## 2021-10-08 ASSESSMENT — PAIN DESCRIPTION - PAIN TYPE: TYPE: ACUTE PAIN

## 2021-10-08 ASSESSMENT — PAIN DESCRIPTION - PROGRESSION: CLINICAL_PROGRESSION: GRADUALLY WORSENING

## 2021-10-08 ASSESSMENT — PAIN DESCRIPTION - ORIENTATION: ORIENTATION: LOWER

## 2021-10-08 ASSESSMENT — PAIN DESCRIPTION - FREQUENCY: FREQUENCY: INTERMITTENT

## 2021-10-08 ASSESSMENT — PAIN DESCRIPTION - LOCATION: LOCATION: ABDOMEN

## 2021-10-08 ASSESSMENT — PAIN SCALES - GENERAL
PAINLEVEL_OUTOF10: 5
PAINLEVEL_OUTOF10: 10
PAINLEVEL_OUTOF10: 3
PAINLEVEL_OUTOF10: 3

## 2021-10-08 ASSESSMENT — LIFESTYLE VARIABLES: SMOKING_STATUS: 1

## 2021-10-08 ASSESSMENT — PAIN - FUNCTIONAL ASSESSMENT: PAIN_FUNCTIONAL_ASSESSMENT: PREVENTS OR INTERFERES SOME ACTIVE ACTIVITIES AND ADLS

## 2021-10-08 ASSESSMENT — PAIN DESCRIPTION - ONSET: ONSET: GRADUAL

## 2021-10-08 ASSESSMENT — PAIN DESCRIPTION - DESCRIPTORS: DESCRIPTORS: CRAMPING;SHARP

## 2021-10-08 NOTE — ANESTHESIA PROCEDURE NOTES
Epidural Block    Patient location during procedure: OB  Start time: 10/8/2021 1:22 PM  End time: 10/8/2021 1:27 PM  Reason for block: labor epidural  Staffing  Performed: other anesthesia staff   Anesthesiologist: Elen Pascual MD  Resident/CRNA: Rosellen Kehr, APRN - CRNA  Other anesthesia staff: Morena Cenra RN  Preanesthetic Checklist  Completed: patient identified, IV checked, site marked, risks and benefits discussed, surgical consent, monitors and equipment checked, pre-op evaluation, timeout performed, anesthesia consent given, oxygen available and patient being monitored  Epidural  Patient position: sitting  Prep: ChloraPrep  Patient monitoring: cardiac monitor, continuous pulse ox and frequent blood pressure checks  Approach: midline  Location: lumbar (1-5)  Injection technique: MARYAM air  Provider prep: mask and sterile gloves  Needle  Needle type: Tuohy   Needle gauge: 18 G  Needle length: 2.5 in  Needle insertion depth: 8 cm  Catheter type: end hole  Catheter size: 20 G.   Catheter at skin depth: 13 cm  Test dose: negative  Assessment  Hemodynamics: stable  Attempts: 1
0 = independent

## 2021-10-08 NOTE — L&D DELIVERY NOTE
Patient c/o need to push, RN asked me to check her because she still had cervix all around. Cervix complete with head on perineum. Pushed through 1 contraction for  liveborn male, nuchal cord x 2 tight, reduced after delivery of body. Vigorous at delivery, Apgars 9,9. Dried and stimulated on bed, cord clamped at 30 seconds and baby handed to nurses. Placenta spontaneous, complete, 3V cord. Fundus massaged to firm, no active bleeding, EBL ~200 cc, pitocin in 4th stage. Dr. Salas Aneth notified of delivery.   Venous blood gasses and cord blood obtained and sent to lab

## 2021-10-08 NOTE — PROGRESS NOTES
Pt admitted to 317 from L&D via wheelchair, oriented to room and call light, physical assessment as charted, discussed vaccinations,  screenings, plan of care.  Call light within reach, no complaints at present

## 2021-10-08 NOTE — H&P
Obstetric History and Physical       CHIEF COMPLAINT:  contractions    HISTORY OF PRESENT ILLNESS:      Vik Rodriguez is a 33 y.o., [id number], female who presents at 39w0d with an [address] of Estimated Date of Delivery: 10/15/21. OB History        5    Para   4    Term   4            AB        Living   4       SAB        TAB        Ectopic        Molar        Multiple        Live Births   4            Patient presents with a chief complaint as above and is being admitted for labor. Contractions began at 6 AM and are progressively getting closer together. They are now every 1-2 minutes. Pain 10/10  Denies LOF or vaginal bleeding  Seen in triage and sent home two days ago because she wasn't in labor at that time       PRENATAL CARE:    Complicated by: marijuana use, AGA with head measuring small    PAST OB HISTORY  OB History        5    Para   4    Term   4            AB        Living   4       SAB        TAB        Ectopic        Molar        Multiple        Live Births   4                Past Medical History:    No past medical history on file.   Past Surgical History:        Procedure Laterality Date    UPPER GASTROINTESTINAL ENDOSCOPY       Allergies:  No known allergies  Social History:    Social History     Socioeconomic History    Marital status: Single     Spouse name: Not on file    Number of children: Not on file    Years of education: Not on file    Highest education level: Not on file   Occupational History    Not on file   Tobacco Use    Smoking status: Current Some Day Smoker     Packs/day: 0.25     Years: 15.00     Pack years: 3.75     Types: Cigarettes     Start date: 6/15/2021    Smokeless tobacco: Never Used   Vaping Use    Vaping Use: Never used   Substance and Sexual Activity    Alcohol use: No    Drug use: No    Sexual activity: Yes     Partners: Male   Other Topics Concern    Not on file   Social History Narrative    Not on file     Social Determinants of Health     Financial Resource Strain:     Difficulty of Paying Living Expenses:    Food Insecurity:     Worried About Running Out of Food in the Last Year:     [address] in the Last Year:    Transportation Needs:     Lack of Transportation (Medical):  Lack of Transportation (Non-Medical):    Physical Activity:     Days of Exercise per Week:     Minutes of Exercise per Session:    Stress:     Feeling of Stress :    Social Connections:     Frequency of Communication with Friends and Family:     Frequency of Social Gatherings with Friends and Family:     Attends [employer] Services:     Active Member of Clubs or Organizations:     Attends Club or Organization Meetings:     Marital Status:    Intimate Partner Violence:     Fear of Current or Ex-Partner:     Emotionally Abused:     Physically Abused:     Sexually Abused:      Family History:   No family history on file. Medications Prior to Admission:  Medications Prior to Admission: Prenatal Vit-Fe Fumarate-FA (PRENATAL 19) 29-1 MG CHEW, Take 1 tablet by mouth daily    REVIEW OF SYSTEMS:    CONSTITUTIONAL:  negative  RESPIRATORY:  negative  CARDIOVASCULAR:  negative  GASTROINTESTINAL:  negative  ALLERGIC/IMMUNOLOGIC:  negative  NEUROLOGICAL:  negative  BEHAVIOR/PSYCH:  negative    PHYSICAL EXAM:  There were no vitals filed for this visit. General appearance:  awake, alert, cooperative, no apparent distress, and appears stated age  Neurologic:  Awake, alert, oriented to name, place and time. Lungs:  No increased work of breathing, good air exchange  Abdomen:  Soft, non tender, gravid, consistent with her gestational age, EFW by Vania's manouever was 3200 g   Fetal heart rate:  Reassuring.   Pelvis:  Adequate pelvis  Cervix: 4 cm 75% soft -2  Contraction frequency:  2 minutes    Membranes:  Intact    ASSESSMENT AND PLAN:    Labor: Admit, anticipate normal delivery, routine labor orders  Fetus: Reassuring  GBS: Yes  Other: pitocin

## 2021-10-08 NOTE — ANESTHESIA PRE PROCEDURE
Department of Anesthesiology  Preprocedure Note       Name:  Joby Das   Age:  33 y.o.  :  1990                                          MRN:  [id number]         Date:  10/8/2021      Surgeon: * No surgeons listed *    Procedure: * No procedures listed *    Medications prior to admission:   Prior to Admission medications    Medication Sig Start Date End Date Taking?  Authorizing Provider   Prenatal Vit-Fe Fumarate-FA (PRENATAL 19) 29-1 MG CHEW Take 1 tablet by mouth daily 21  Yes Magness Creeks, DO       Current medications:    Current Facility-Administered Medications   Medication Dose Route Frequency Provider Last Rate Last Admin    povidone-iodine (BETADINE) 10 % external solution             lidocaine 1 % injection             oxytocin (PITOCIN) 30 units in 500 mL infusion Override Pull             lactated ringers infusion   IntraVENous Continuous Magness Creeks, DO        lactated ringers bolus  500 mL IntraVENous PRN [place] Chum V, DO        Or    lactated ringers bolus  1,000 mL IntraVENous PRN Magness Creeks, DO        oxytocin (PITOCIN) 30 units in 500 mL infusion  87.3 rafael-units/min IntraVENous Continuous PRN Magness Creeks, DO        And    oxytocin (PITOCIN) 10 unit bolus from the bag  10 Units IntraVENous PRN Magness Creeks, DO        [facility] injection 4 mg  4 mg IntraVENous Q6H PRN Magness Creeks, DO        sodium chloride flush 0.9 % injection 5-40 mL  5-40 mL IntraVENous 2 times per day Magness Creeks, DO        lactated ringers infusion   IntraVENous Continuous Magness Creeks, DO        lactated ringers bolus  500 mL IntraVENous PRN [place] Chum V, DO        Or    lactated ringers bolus  1,000 mL IntraVENous PRN Magness Creeks, DO        sodium chloride flush 0.9 % injection 5-40 mL  5-40 mL IntraVENous 2 times per day Magness Creeks, DO        sodium chloride flush 0.9 % injection 5-40 mL  5-40 mL IntraVENous PRN [place] Chum V, DO        0.9 % sodium chloride infusion  25 mL IntraVENous PRN Epimenio Christian, DO        oxytocin (PITOCIN) 30 units in 500 mL infusion  87.3 rafael-units/min IntraVENous Continuous PRN Epimenio Christian, DO        And    oxytocin (PITOCIN) 10 unit bolus from the bag  10 Units IntraVENous PRN Epimenio Christian, DO        ondansetron [facility]) injection 4 mg  4 mg IntraVENous Q6H PRN Epimenio Christian, DO        docusate sodium (COLACE) capsule 100 mg  100 mg Oral BID Epimenio Christian, DO        oxytocin (PITOCIN) 30 units in 500 mL infusion  1-20 rafael-units/min IntraVENous Continuous Epimenio Christian, DO        penicillin G potassium IVPB 3 Million Units  3 Million Units IntraVENous Q4H Epimenio Christian, DO        penicillin G potassium [id number] units injection             butorphanol (STADOL) injection 2 mg  2 mg IntraVENous Q3H PRN Wyona Landau V, DO   2 mg at 10/08/21 1204    fentaNYL 1.85mcg/ml and bupivacaine 0.1% 15ml syringe (Home Care) (OB) 15 mL epidural  15 mL Epidural Once Emelia Ochoa MD        fentaNYL 1.85mcg/ml and Bupivicaine 0.1% in 0.9% NS 135ml infusion (OB) epidural  15 mL/hr Epidural Continuous Emelia Ochoa MD 15 mL/hr at 10/08/21 1331 15 mL/hr at 10/08/21 1331    naloxone (NARCAN) injection 0.4 mg  0.4 mg IntraVENous PRN Emelia Ochoa MD        nalbuphine (NUBAIN) injection 5 mg  5 mg IntraVENous Q4H PRN Emelia Ochoa MD        ondansetron [facility]) injection 4 mg  4 mg IntraVENous Q6H PRN Emelia Ocoha MD           Allergies:  No Known Allergies    Problem List:    Patient Active Problem List   Diagnosis Code    Normal pregnancy, unspecified trimester Z34.90    Uterine contractions during pregnancy O47.9    39 weeks gestation of pregnancy Z3A.39       Past Medical History:  No past medical history on file.     Past Surgical History:        Procedure Laterality Date    UPPER GASTROINTESTINAL ENDOSCOPY         Social History:    Social History     Tobacco Use    Smoking status: Current Some Day Smoker Packs/day: 0.25     Years: 15.00     Pack years: 3.75     Types: Cigarettes     Start date: 6/15/2021    Smokeless tobacco: Never Used   Substance Use Topics    Alcohol use: No                                Ready to quit: Not Answered  Counseling given: Not Answered      Vital Signs (Current):   Vitals:    10/08/21 1204   Weight: 169 lb (76.7 kg)   Height: 4' 11\" (1.499 m)                                              BP Readings from Last 3 Encounters:   10/06/21 131/64   10/05/21 135/69   09/15/21 106/70       NPO Status:                                                                                 BMI:   Wt Readings from Last 3 Encounters:   10/08/21 169 lb (76.7 kg)   10/06/21 169 lb (76.7 kg)   10/05/21 172 lb (78 kg)     Body mass index is 34.13 kg/m². CBC:   Lab Results   Component Value Date    WBC 7.7 10/08/2021    RBC 4.60 10/08/2021    HGB 11.7 10/08/2021    HCT 35.9 10/08/2021    MCV 78.0 10/08/2021    RDW 16.8 10/08/2021     10/08/2021       CMP:   Lab Results   Component Value Date     02/24/2021    K 3.2 02/24/2021     02/24/2021    CO2 23 02/24/2021    BUN 9 02/24/2021    CREATININE 0.9 02/24/2021    GFRAA >60 02/24/2021    LABGLOM >60 02/24/2021    GLUCOSE 84 02/24/2021    GLUCOSE 86 04/02/2012    PROT 6.5 02/24/2021    CALCIUM 8.9 02/24/2021    BILITOT 0.4 02/24/2021    ALKPHOS 47 02/24/2021    AST 17 02/24/2021    ALT 24 02/24/2021       POC Tests: No results for input(s): POCGLU, POCNA, POCK, POCCL, POCBUN, POCHEMO, POCHCT in the last 72 hours.     Coags: No results found for: PROTIME, INR, APTT    HCG (If Applicable):   Lab Results   Component Value Date    PREGTESTUR POSITIVE 04/29/2021        ABGs: No results found for: PHART, PO2ART, MKD1HZB, IDH2GXG, BEART, J2ZDIMSG     Type & Screen (If Applicable):  No results found for: LABABO, LABRH    Drug/Infectious Status (If Applicable):  No results found for: HIV, HEPCAB    COVID-19 Screening (If Applicable): No results found for: COVID19        Anesthesia Evaluation  Patient summary reviewed and Nursing notes reviewed no history of anesthetic complications:   Airway: Mallampati: II  TM distance: >3 FB   Neck ROM: full  Mouth opening: > = 3 FB Dental:          Pulmonary:Negative Pulmonary ROS and normal exam  breath sounds clear to auscultation  (+) current smoker          Patient smoked on day of surgery. ROS comment: Smokes 1/2 PPD x 15 years   Cardiovascular:Negative CV ROS  Exercise tolerance: good (>4 METS),           Rhythm: regular  Rate: normal           Beta Blocker:  Not on Beta Blocker         Neuro/Psych:   Negative Neuro/Psych ROS              GI/Hepatic/Renal: Neg GI/Hepatic/Renal ROS            Endo/Other:    (+) blood dyscrasia: anemia:., .                 Abdominal:             Vascular: negative vascular ROS. Other Findings:             Anesthesia Plan      general, MAC and spinal     ASA 2             Anesthetic plan and risks discussed with patient.                   CBC   Lab Results   Component Value Date    WBC 7.7 10/08/2021    RBC 4.60 10/08/2021    HGB 11.7 10/08/2021    HCT 35.9 10/08/2021    MCV 78.0 10/08/2021    RDW 16.8 10/08/2021     10/08/2021     CMP    Lab Results   Component Value Date     02/24/2021    K 3.2 02/24/2021     02/24/2021    CO2 23 02/24/2021    BUN 9 02/24/2021    CREATININE 0.9 02/24/2021    GFRAA >60 02/24/2021    LABGLOM >60 02/24/2021    GLUCOSE 84 02/24/2021    GLUCOSE 86 04/02/2012    PROT 6.5 02/24/2021    CALCIUM 8.9 02/24/2021    BILITOT 0.4 02/24/2021    ALKPHOS 47 02/24/2021    AST 17 02/24/2021    ALT 24 02/24/2021     BMP    Lab Results   Component Value Date     02/24/2021    K 3.2 02/24/2021     02/24/2021    CO2 23 02/24/2021    BUN 9 02/24/2021    CREATININE 0.9 02/24/2021    CALCIUM 8.9 02/24/2021    GFRAA >60 02/24/2021    LABGLOM >60 02/24/2021    GLUCOSE 84 02/24/2021    GLUCOSE 86 04/02/2012 POCGlucose  No results for input(s): GLUCOSE in the last 72 hours.      Coags  No results found for: PROTIME, INR, APTT    HCG (If Applicable)   Lab Results   Component Value Date    PREGTESTUR POSITIVE 04/29/2021        ABGs   No results found for: PHART, PO2ART, DKP7HRO, ADE8JPK, BEART, U5IIGQGQ     Type & Screen (If Applicable)  Lab Results   Component Value Date    ABORH B POS 10/08/2021     Active Problem List with ICD10 Codes  Patient Active Problem List   Diagnosis Code    Normal pregnancy, unspecified trimester Z34.90    Uterine contractions during pregnancy O47.9    39 weeks gestation of pregnancy Z3A.39       LALIT Baker CRNA  October 8, 2021  1:34 PM      LALIT Baker CRNA   10/8/2021

## 2021-10-09 LAB
HCT VFR BLD CALC: 25.6 % (ref 34–48)
HEMOGLOBIN: 8.2 G/DL (ref 11.5–15.5)

## 2021-10-09 PROCEDURE — 6370000000 HC RX 637 (ALT 250 FOR IP): Performed by: MIDWIFE

## 2021-10-09 PROCEDURE — G0008 ADMIN INFLUENZA VIRUS VAC: HCPCS | Performed by: STUDENT IN AN ORGANIZED HEALTH CARE EDUCATION/TRAINING PROGRAM

## 2021-10-09 PROCEDURE — 6360000002 HC RX W HCPCS: Performed by: STUDENT IN AN ORGANIZED HEALTH CARE EDUCATION/TRAINING PROGRAM

## 2021-10-09 PROCEDURE — 85018 HEMOGLOBIN: CPT

## 2021-10-09 PROCEDURE — 85014 HEMATOCRIT: CPT

## 2021-10-09 PROCEDURE — 1220000000 HC SEMI PRIVATE OB R&B

## 2021-10-09 PROCEDURE — 90686 IIV4 VACC NO PRSV 0.5 ML IM: CPT | Performed by: STUDENT IN AN ORGANIZED HEALTH CARE EDUCATION/TRAINING PROGRAM

## 2021-10-09 PROCEDURE — 36415 COLL VENOUS BLD VENIPUNCTURE: CPT

## 2021-10-09 RX ADMIN — INFLUENZA A VIRUS A/VICTORIA/2570/2019 IVR-215 (H1N1) ANTIGEN (PROPIOLACTONE INACTIVATED), INFLUENZA A VIRUS A/CAMBODIA/E0826360/2020 IVR-224 (H3N2) ANTIGEN (PROPIOLACTONE INACTIVATED), INFLUENZA B VIRUS B/VICTORIA/705/2018 BVR-11 ANTIGEN (PROPIOLACTONE INACTIVATED), INFLUENZA B VIRUS B/PHUKET/3073/2013 BVR-1B ANTIGEN (PROPIOLACTONE INACTIVATED) 0.5 ML: 15; 15; 15; 15 INJECTION, SUSPENSION INTRAMUSCULAR at 13:30

## 2021-10-09 RX ADMIN — FERROUS SULFATE TAB 325 MG (65 MG ELEMENTAL FE) 325 MG: 325 (65 FE) TAB at 17:19

## 2021-10-09 RX ADMIN — DOCUSATE SODIUM 100 MG: 100 CAPSULE ORAL at 09:32

## 2021-10-09 RX ADMIN — DOCUSATE SODIUM 100 MG: 100 CAPSULE ORAL at 20:27

## 2021-10-09 RX ADMIN — IBUPROFEN 800 MG: 800 TABLET, FILM COATED ORAL at 05:07

## 2021-10-09 RX ADMIN — ACETAMINOPHEN 650 MG: 325 TABLET ORAL at 03:37

## 2021-10-09 RX ADMIN — FERROUS SULFATE TAB 325 MG (65 MG ELEMENTAL FE) 325 MG: 325 (65 FE) TAB at 09:33

## 2021-10-09 RX ADMIN — ACETAMINOPHEN 650 MG: 325 TABLET ORAL at 06:56

## 2021-10-09 RX ADMIN — IBUPROFEN 800 MG: 800 TABLET, FILM COATED ORAL at 20:27

## 2021-10-09 RX ADMIN — ACETAMINOPHEN 650 MG: 325 TABLET ORAL at 11:22

## 2021-10-09 RX ADMIN — ACETAMINOPHEN 650 MG: 325 TABLET ORAL at 17:19

## 2021-10-09 RX ADMIN — IBUPROFEN 800 MG: 800 TABLET, FILM COATED ORAL at 13:27

## 2021-10-09 ASSESSMENT — PAIN SCALES - GENERAL
PAINLEVEL_OUTOF10: 6
PAINLEVEL_OUTOF10: 3
PAINLEVEL_OUTOF10: 3
PAINLEVEL_OUTOF10: 9
PAINLEVEL_OUTOF10: 3
PAINLEVEL_OUTOF10: 8
PAINLEVEL_OUTOF10: 3

## 2021-10-09 ASSESSMENT — PAIN DESCRIPTION - PAIN TYPE: TYPE: ACUTE PAIN

## 2021-10-09 ASSESSMENT — PAIN - FUNCTIONAL ASSESSMENT
PAIN_FUNCTIONAL_ASSESSMENT: ACTIVITIES ARE NOT PREVENTED
PAIN_FUNCTIONAL_ASSESSMENT: ACTIVITIES ARE NOT PREVENTED

## 2021-10-09 ASSESSMENT — PAIN DESCRIPTION - FREQUENCY
FREQUENCY: INTERMITTENT
FREQUENCY: INTERMITTENT

## 2021-10-09 ASSESSMENT — PAIN DESCRIPTION - DESCRIPTORS: DESCRIPTORS: CRAMPING

## 2021-10-09 ASSESSMENT — PAIN DESCRIPTION - LOCATION: LOCATION: ABDOMEN

## 2021-10-09 NOTE — ANESTHESIA POSTPROCEDURE EVALUATION
Department of Anesthesiology  Postprocedure Note    Patient: Светлана Kingston  MRN: [id number]  YOB: 1990  Date of evaluation: 10/9/2021  Time:  7:00 AM     Procedure Summary     Date: 10/08/21 Room / Location:     Anesthesia Start: 1320 Anesthesia Stop: 1083    Procedure: Labor Analgesia Diagnosis:     Scheduled Providers:  Responsible Provider: Harsha Byrne MD    Anesthesia Type: general, MAC, spinal ASA Status: 2          Anesthesia Type: general, MAC, spinal    Kelly Phase I:      Kelly Phase II:      Last vitals: Reviewed and per EMR flowsheets.        Anesthesia Post Evaluation    Patient location during evaluation: bedside  Patient participation: complete - patient participated  Level of consciousness: awake and alert  Airway patency: patent  Nausea & Vomiting: no nausea and no vomiting  Complications: no  Cardiovascular status: hemodynamically stable  Respiratory status: acceptable and spontaneous ventilation  Hydration status: euvolemic

## 2021-10-09 NOTE — PROGRESS NOTES
Per Bart Alvarez,  for [facility] [place], baby CAN go home with mom day of discharge.

## 2021-10-09 NOTE — PLAN OF CARE
Problem: Anxiety:  Goal: Level of anxiety will decrease  Description: Level of anxiety will decrease  10/8/2021 1758 by Edel Wyman RN  Outcome: Completed  10/8/2021 1713 by John Paul Coronel RN  Outcome: Met This Shift     Problem: Breathing Pattern - Ineffective:  Goal: Able to breathe comfortably  Description: Able to breathe comfortably  10/8/2021 1758 by Edel Wyman RN  Outcome: Completed  10/8/2021 1713 by John Paul Coronel RN  Outcome: Met This Shift     Problem: Fluid Volume - Imbalance:  Goal: Absence of imbalanced fluid volume signs and symptoms  Description: Absence of imbalanced fluid volume signs and symptoms  10/8/2021 1758 by Edel Wyman RN  Outcome: Completed  10/8/2021 1713 by John Paul Coronel RN  Outcome: Met This Shift  Goal: Absence of intrapartum hemorrhage signs and symptoms  Description: Absence of intrapartum hemorrhage signs and symptoms  10/8/2021 1758 by Edel Wyman RN  Outcome: Completed  10/8/2021 1713 by John Paul Coronel RN  Outcome: Met This Shift  Goal: Absence of postpartum hemorrhage signs and symptoms  Description: Absence of postpartum hemorrhage signs and symptoms  10/8/2021 1758 by Edel Wyman RN  Outcome: Completed     Problem: Infection - Intrapartum Infection:  Goal: Will show no infection signs and symptoms  Description: Will show no infection signs and symptoms  10/8/2021 1758 by Edel Wyman RN  Outcome: Completed  10/8/2021 1713 by John Paul Coronel RN  Outcome: Met This Shift     Problem: Labor Process - Prolonged:  Goal: Labor progression, first stage, within specified pattern  Description: Labor progression, first stage, within specified pattern  10/8/2021 1758 by Edel Wyman RN  Outcome: Completed  10/8/2021 1713 by John Paul Coronel RN  Outcome: Met This Shift  Goal: Labor progession, second stage, within specified pattern  Description: Labor progession, second stage, within specified pattern  10/8/2021 1758 by Edel Wyman RN  Outcome: Completed  10/8/2021 1713 by Didi Law RN  Outcome: Met This Shift  Goal: Uterine contractions within specified parameters  Description: Uterine contractions within specified parameters  10/8/2021 1758 by Shoaib Maldonado RN  Outcome: Completed  10/8/2021 1713 by Didi Law RN  Outcome: Met This Shift     Problem:  Screening:  Goal: Ability to make informed decisions regarding treatment has improved  Description: Ability to make informed decisions regarding treatment has improved  10/8/2021 1758 by Shoaib Maldonado RN  Outcome: Completed  10/8/2021 1713 by Didi Law RN  Outcome: Met This Shift     Problem: Pain - Acute:  Goal: Pain level will decrease  Description: Pain level will decrease  10/8/2021 175 by Shoaib Maldonado RN  Outcome: Completed  10/8/2021 1713 by Didi Law RN  Outcome: Met This Shift  Goal: Able to cope with pain  Description: Able to cope with pain  10/8/2021 175 by Shoaib Maldonado RN  Outcome: Completed  10/8/2021 1713 by Didi Law RN  Outcome: Met This Shift     Problem: Tissue Perfusion - Uteroplacental, Altered:  Goal: Absence of abnormal fetal heart rate pattern  Description: Absence of abnormal fetal heart rate pattern  10/8/2021 175 by Shoaib Maldonado RN  Outcome: Completed  10/8/2021 1713 by Didi Law RN  Outcome: Met This Shift     Problem: Urinary Retention:  Goal: Experiences of bladder distention will decrease  Description: Experiences of bladder distention will decrease  10/8/2021 1758 by Shoaib Maldonado RN  Outcome: Completed  10/8/2021 1713 by Didi Law RN  Outcome: Met This Shift  Goal: Urinary elimination within specified parameters  Description: Urinary elimination within specified parameters  10/8/2021 1758 by Shoaib Maldonado RN  Outcome: Completed  10/8/2021 1713 by Didi Law RN  Outcome: Met This Shift     Problem: Discharge Planning:  Goal: Discharged to appropriate level of care  Description: Discharged to appropriate level of care  10/8/2021 1758 by Shoaib Maldonado RN  Outcome: Completed     Problem: Constipation:  Goal: Bowel elimination is within specified parameters  Description: Bowel elimination is within specified parameters  10/8/2021 1758 by Ella Lyle RN  Outcome: Completed     Problem: Infection - Risk of, Puerperal Infection:  Goal: Will show no infection signs and symptoms  Description: Will show no infection signs and symptoms  10/8/2021 1758 by Ella Lyle RN  Outcome: Completed  10/8/2021 1713 by Jennifer Smith RN  Outcome: Met This Shift     Problem: Mood - Altered:  Goal: Mood stable  Description: Mood stable  10/8/2021 1758 by Ella Lyle RN  Outcome: Completed     Problem: Fluid Volume - Imbalance:  Goal: Absence of imbalanced fluid volume signs and symptoms  Description: Absence of imbalanced fluid volume signs and symptoms  Outcome: Met This Shift  Goal: Absence of postpartum hemorrhage signs and symptoms  Description: Absence of postpartum hemorrhage signs and symptoms  10/9/2021 0123 by Kayli Thayer RN  Outcome: Met This Shift  10/8/2021 1759 by Ella Lyle RN  Outcome: Met This Shift     Problem: Infection - Risk of, Puerperal Infection:  Goal: Will show no infection signs and symptoms  Description: Will show no infection signs and symptoms  Outcome: Met This Shift     Problem: Mood - Altered:  Goal: Mood stable  Description: Mood stable  Outcome: Met This Shift     Problem: Pain - Acute:  Goal: Pain level will decrease  Description: Pain level will decrease  Outcome: Met This Shift     Problem: Pain:  Goal: Pain level will decrease  Description: Pain level will decrease  10/8/2021 1758 by Ella Lyle RN  Outcome: Completed  10/8/2021 1713 by Jennifer Smith RN  Outcome: Met This Shift  Goal: Control of acute pain  Description: Control of acute pain  10/8/2021 1758 by Ella Lyle RN  Outcome: Completed  Goal: Control of chronic pain  Description: Control of chronic pain  10/8/2021 1758 by Ella Lyle RN  Outcome: Completed

## 2021-10-09 NOTE — PROGRESS NOTES
Uneventful day. Ambulating. Voiding. Vaginal bleeding RNA. Taking in po well. Satisfied with pain control.

## 2021-10-09 NOTE — PROGRESS NOTES
Hearing screening results were discussed with parent. Questions answered. Brochure given to parent. Advised to monitor developmental milestones and contact physician for any concerns.    Electronically signed by Charan Little on 10/9/2021 at 10:59 AM

## 2021-10-09 NOTE — PROGRESS NOTES
Subjective:    Patient without complaints. Normal lochia.       Objective:  /75   Pulse 56   Temp 98.2 °F (36.8 °C) (Oral)   Resp 18   Ht 4' 11\" (1.499 m)   Wt 169 lb (76.7 kg)   LMP 12/20/2020   SpO2 100%   Breastfeeding Unknown   BMI 34.13 kg/m²    Lungs cta  Heart rrr  Abdomen:  Uterus firm, non-tender  Extremities:  No calf pain    Assessment:  Post-partum day # 1   Vaginal delivery    Plan:Continue current care

## 2021-10-10 VITALS
SYSTOLIC BLOOD PRESSURE: 109 MMHG | HEART RATE: 62 BPM | WEIGHT: 169 LBS | HEIGHT: 59 IN | RESPIRATION RATE: 18 BRPM | TEMPERATURE: 98.3 F | OXYGEN SATURATION: 99 % | DIASTOLIC BLOOD PRESSURE: 62 MMHG | BODY MASS INDEX: 34.07 KG/M2

## 2021-10-10 PROCEDURE — 99024 POSTOP FOLLOW-UP VISIT: CPT | Performed by: OBSTETRICS & GYNECOLOGY

## 2021-10-10 PROCEDURE — 6370000000 HC RX 637 (ALT 250 FOR IP): Performed by: MIDWIFE

## 2021-10-10 RX ORDER — IBUPROFEN 800 MG/1
800 TABLET ORAL EVERY 6 HOURS PRN
Qty: 120 TABLET | Refills: 0 | Status: SHIPPED | OUTPATIENT
Start: 2021-10-10

## 2021-10-10 RX ADMIN — ACETAMINOPHEN 650 MG: 325 TABLET ORAL at 02:33

## 2021-10-10 RX ADMIN — FERROUS SULFATE TAB 325 MG (65 MG ELEMENTAL FE) 325 MG: 325 (65 FE) TAB at 08:37

## 2021-10-10 RX ADMIN — ACETAMINOPHEN 650 MG: 325 TABLET ORAL at 08:37

## 2021-10-10 RX ADMIN — DOCUSATE SODIUM 100 MG: 100 CAPSULE ORAL at 08:37

## 2021-10-10 RX ADMIN — IBUPROFEN 800 MG: 800 TABLET, FILM COATED ORAL at 08:37

## 2021-10-10 ASSESSMENT — PAIN DESCRIPTION - DESCRIPTORS
DESCRIPTORS: CRAMPING
DESCRIPTORS: CRAMPING;DISCOMFORT
DESCRIPTORS: CRAMPING

## 2021-10-10 ASSESSMENT — PAIN - FUNCTIONAL ASSESSMENT
PAIN_FUNCTIONAL_ASSESSMENT: ACTIVITIES ARE NOT PREVENTED

## 2021-10-10 ASSESSMENT — PAIN SCALES - GENERAL
PAINLEVEL_OUTOF10: 3
PAINLEVEL_OUTOF10: 2
PAINLEVEL_OUTOF10: 2
PAINLEVEL_OUTOF10: 8

## 2021-10-10 ASSESSMENT — PAIN DESCRIPTION - ONSET: ONSET: ON-GOING

## 2021-10-10 ASSESSMENT — PAIN DESCRIPTION - PROGRESSION
CLINICAL_PROGRESSION: GRADUALLY WORSENING
CLINICAL_PROGRESSION: GRADUALLY IMPROVING

## 2021-10-10 ASSESSMENT — PAIN DESCRIPTION - PAIN TYPE
TYPE: ACUTE PAIN

## 2021-10-10 ASSESSMENT — PAIN DESCRIPTION - ORIENTATION
ORIENTATION: LOWER
ORIENTATION: LOWER

## 2021-10-10 ASSESSMENT — PAIN DESCRIPTION - FREQUENCY
FREQUENCY: INTERMITTENT

## 2021-10-10 ASSESSMENT — PAIN DESCRIPTION - LOCATION
LOCATION: ABDOMEN

## 2021-10-10 NOTE — DISCHARGE SUMMARY
Obstetrical Discharge Form         Patients Name  Angelica Farah    Gestational Age:  39w0d    Antepartum complications: none    Date of Delivery:   10/8/2021       2:42 PM      Type of Delivery:   Vaginal, Spontaneous [250]   Rupture Date/time:    10/8/2021      12:14 PM     Presentation:    Vertex [1]   Position:                      Anesthesia:    Epidural [254]     Feeding method:         Delivered By:   Kam Rosenbaum     Baby:       Information for the patient's :  Amanda Delgado [[id number]]          Intrapartum complications: None  Postpartum complications: none  Discharge Date:   10/10/2021  Discharge Condition: Stable  Disposition:   Home    Plan:   Follow up    in 6 weeks

## 2021-10-10 NOTE — PROGRESS NOTES
Discharge teaching done with mom. Instructions and rx for Motrin given. Mom verbalizes understanding. Ready for discharge.

## 2021-10-10 NOTE — PROGRESS NOTES
Assessment as charted. Fundus firm @ U, small amount of lochia, rubra- no clots. Denies the need for pain medication. Patient is using a heating pad at this time for abdominal cramping. Instructed to call for any needs. No current complaints.

## 2021-10-11 LAB
INTEGRITY CHECK, CREATININE, URINE: 88.2
INTEGRITY CHECK, OXIDANT, URINE: <40
INTEGRITY CHECK, PH, URINE: 5.3 (ref 4.5–9)
INTEGRITY CHECK, SPECIFIC GRAVITY, URINE: 1.01 (ref 1–1.03)
INTEGRITY CHECK, SPECIMEN INTEGRITY, URINE: NORMAL

## 2021-10-20 LAB
AMPHETAMINES, URINE: 186 NG/ML
CANNABINOIDS CONF, URINE: 239 NG/ML
METHAMPHETAMINE, URINE: 868 NG/ML
METHYLENEDIOXYAMPHETAMINE, UR: <200 NG/ML
METHYLENEDIOXYETHYLAMPHETAMINE, UR: <200 NG/ML
METHYLENEDIOXYMETHAMPHETAMINE, UR: <200 NG/ML

## 2021-11-02 ENCOUNTER — HOSPITAL ENCOUNTER (EMERGENCY)
Age: 31
Discharge: LEFT BEFORE DISCHARGE | End: 2021-11-02
Payer: COMMERCIAL

## 2021-11-02 VITALS
SYSTOLIC BLOOD PRESSURE: 128 MMHG | DIASTOLIC BLOOD PRESSURE: 75 MMHG | OXYGEN SATURATION: 99 % | RESPIRATION RATE: 16 BRPM | HEART RATE: 97 BPM | TEMPERATURE: 97.7 F

## 2021-11-02 DIAGNOSIS — F22 DELUSIONS (HCC): Primary | ICD-10-CM

## 2021-11-02 PROCEDURE — 99283 EMERGENCY DEPT VISIT LOW MDM: CPT

## 2021-11-02 NOTE — ED PROVIDER NOTES
[address]  Department of Emergency Medicine   ED  Encounter Note  Admit Date/RoomTime: 2021  3:46 PM  ED Room: ST/ST-3    NAME: Teresa Roberto  : 1990  MRN: [id number]     Chief Complaint:  Rash (Rash started on both feet and hands, now spreading to chest and shoulder)    History of Present Illness       Teresa Roberto is a 33 y.o. old female who presents to the emergency department by private vehicle with her 3year old son and 2 week old infant, for gradual onset of rash on feet and hands and is now spreading to her chest and shoulder and thinks she may be getting bug bites from bed bugs but denies any infestations. Patient denies any fever, chills or URI symptoms. She denies any difficulty breathing, wheezing or hives. She is not breast feeding and denies any new medications, foods or lotions. She signed in the 2 children for similar rashes which were not see on evaluation. Prior history of similar episodes: No.   Her symptoms are associated with nothing additional and relieved by nothing. She denies any localized erythema and swelling, difficulty swallowing, throat tightness, lightheadedness, dizziness, facial swelling, lip swelling, chest pain, abd pain, drainage, increased redness or increased swelling. Patient denies any recent drug use. She denies any suicidal or homicidal thoughts. Patient feels bugs are crawling out of her toes and on the side of her left index finger. ROS   Pertinent positives and negatives are stated within HPI, all other systems reviewed and are negative. Past Medical History:  has no past medical history on file. Surgical History:  has a past surgical history that includes Upper gastrointestinal endoscopy. Social History:  reports that she has been smoking cigarettes. She started smoking about 4 months ago. She has a 3.75 pack-year smoking history.  She has never used smokeless tobacco. She reports that she does not drink alcohol and does not use drugs. Family History: family history is not on file. Allergies: Patient has no known allergies. Physical Exam   Oxygen Saturation Interpretation: Normal.        ED Triage Vitals [11/02/21 1621]   BP Temp Temp src Pulse Resp SpO2 Height Weight   128/75 97.7 °F (36.5 °C) -- 97 16 99 % -- --         Constitutional:  Alert, development consistent with age. HEENT:  NC/NT. Airway patent. Eyes:  PERRL, EOMI, no discharge. Ears:  TMs without perforation, injection, or bulging. External canals clear without exudate. Mouth:  Mucous membranes moist without lesions, tongue and gums normal.  Throat:  Pharynx without injection, exudate, or tonsillar hypertrophy. Airway patient. Neck:  Supple. No lymphadenopathy. Respiratory:  Clear to auscultation and breath sounds equal.  CV:  Regular rate and rhythm. GI:  Abdomen Soft, nontender, +BS. Integument:  Skin turgor: Normal. No rashes seen. One small resolving ecchymotic area approximately 1 cm in diameter non tender. Neurological:  Orientation age-appropriate unless noted elseware. Motor functions intact. Lab / Imaging Results   (All laboratory and radiology results have been personally reviewed by myself)  Labs:  No results found for this visit on 11/02/21. Imaging: All Radiology results interpreted by Radiologist unless otherwise noted. No orders to display       ED Course / Medical Decision Making   Medications - No data to display  ED Course as of Nov 02 2056   Magali Cowden Nov 02, 2021   1859 Dr. Tye Thomson and  went into talk with patient regarding pink slip and need for further evaluation and nurses state that the patient was in the bathroom. Patient's 2 children sitting in the plummer with a cousin Phuong Ortiz who was being seen in the ED as a patient and did not even know she recently had a baby and patient was not found and cousin states she walked out the bathroom and is no where to be found. Security notified. Dr. Bourgeois was not able to examine patient because she eloped prior to her seeing the patient.   [SE]      ED Course User Index  [SE] LALIT Woods CNP        Re evaluation:  1700 Discussed patient with Dr. Bourgeois and will obtain a  consult. [address] spoke with Kiki Hernandez worker patient and children becoming restless and she will be into see patient. Consults:   IP CONSULT TO SOCIAL WORK Kristine Amor  into talk with patient. [address] her with pink slip filled out and will talk with Dr. Bourgeois to sign. Procedures:   none    MDM:   Discussed with mother that I feel the child has a superficial scratch on her nose and I did not see any rashes on her or the children and she pointed to an area on her dried cuticle and thought it was a bug coming out of her skin. Patient was not convinced that it was not a bug and denies any thoughts of suicide, homicide or any current drug use. Patient has past history of having amphetamines and cannaboinoids in her on October 8, 2021 and will have  evaluate before discharging.  and to talk with patient and feels the patient needs pink slip.  informed me of pink slip and will have Dr. Bourgeois sign the pink slip and see the patient. Patient eloped from the ED before Dr. Bourgeois could see her and left her children with a cousin that was sitting by the bathroom and hallway being seen as a patient. RNs attempted to call patient on phone with no answer. Getting-in notified.  did notify family to secure children and discharged to family members.  did talk to children services. Plan of Care/Counseling:  LALIT Woods CNP reviewed today's visit with the patient in addition to providing specific details for the plan of care and counseling regarding the diagnosis and prognosis.   Questions are answered at this time and are agreeable with the plan. Assessment      1. Delusions [facility])      Plan   Patient eloped from emergency department before evaluation completed. .  Patient condition is stable    New Medications     Discharge Medication List as of 11/2/2021  8:06 PM        Electronically signed by LALIT Pabon CNP   DD: 11/2/21  **This report was transcribed using voice recognition software. Every effort was made to ensure accuracy; however, inadvertent computerized transcription errors may be present.   END OF ED PROVIDER NOTE           LALIT Pabon CNP  11/02/21 9965

## 2021-11-02 NOTE — ED NOTES
I CALLED [facility] AND SPOKE TO JOSEPH RAWLS REPORT AND REQUESTED THAT [facility] CALL ME BACK.      Heidi Thompson, [facility]  11/02/21 1943

## 2021-11-02 NOTE — ED NOTES
SHASHI ESTEVEZ FROM [facility] CALLED BACK AND I GAVE REPORT AND AWAITING CALL BACK.       WALT Camacho  11/02/21 1956

## 2021-11-02 NOTE — ED NOTES
[employer] IS AWARE THAT PT IS PINK SLIPPED AND THAT SHE IS NOW NOT IN THE AREA. IT APPEARS THAT PT LEFT THE ER AND HAS LEFT HER 2 CHILDREN WITH ANOTHER PT WHO IS A RELATIVE. THE OTHER PT SAID \"I AM HER COUSIN AND I SEEN HER WALKING BY AND ASKED HER WHO'S BABY IS THAT, SHE SAID IT'S MY BABY AND ASKED ME TO HOLD THE KIDS WHILE SHE GOES TO THE BATHROOM\". PT HAD BEEN GONE FOR OVER 10 MINUTES AT THAT TIME. I CALLED CHIDI BACK AND SHE WAS NOT AWARE OF WHAT TOOK PLACE AND SAID THAT SHE WOULD CALL PT AND HAVE HER RETURN BACK TO THE ER.                      WALT Tello  11/02/21 1937
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
## 2021-11-03 NOTE — ED NOTES
Marquis More ([facility]) was coming to the ER to attend to the children, but I learned that Lb Vega \"Margaret\" has the kids. Maternal grandma was coming to get the children as well. Marquis More will contact grandma. Thersa Bumpers is aware that pt is pink slipped and left the er.             Emerald Cortes, WALT  11/02/21 2019